# Patient Record
Sex: FEMALE | Race: WHITE | Employment: OTHER | URBAN - METROPOLITAN AREA
[De-identification: names, ages, dates, MRNs, and addresses within clinical notes are randomized per-mention and may not be internally consistent; named-entity substitution may affect disease eponyms.]

---

## 2016-08-12 LAB
ANTIBODY SCREEN: NEGATIVE
C. TRACHOMATIS ANTIGEN: NEGATIVE
CYSTIC FIBROSIS ALLELE 1: NEGATIVE
HCT: 36.7 %
HEPATITIS B SURFACE ANTIGEN: NONREACTIVE
HGB: 12.4 G/DL (ref 12–16)
MEAN CELL VOLUME: 86 FL (ref 81–100)
N.GONORRHOEAE ANTIGEN: NEGATIVE
PLATELETS: 361 10(3)UL
RAPID PLASMA REAGIN: NONREACTIVE
RH BLOOD TYPE: POSITIVE
RUBELLA IGG: NEGATIVE
THINPREP PAP: NEGATIVE

## 2017-01-31 ENCOUNTER — OFFICE VISIT (OUTPATIENT)
Dept: PERINATAL CARE | Facility: HOSPITAL | Age: 37
End: 2017-01-31
Attending: OBSTETRICS & GYNECOLOGY
Payer: COMMERCIAL

## 2017-01-31 VITALS — DIASTOLIC BLOOD PRESSURE: 66 MMHG | WEIGHT: 195 LBS | HEART RATE: 83 BPM | SYSTOLIC BLOOD PRESSURE: 155 MMHG

## 2017-01-31 DIAGNOSIS — O99.212 OBESITY AFFECTING PREGNANCY IN SECOND TRIMESTER: ICD-10-CM

## 2017-01-31 DIAGNOSIS — O34.219 PREVIOUS CESAREAN DELIVERY AFFECTING PREGNANCY, ANTEPARTUM: ICD-10-CM

## 2017-01-31 DIAGNOSIS — O28.8 AMNIOTIC FLUID INDEX BORDERLINE LOW: ICD-10-CM

## 2017-01-31 DIAGNOSIS — O09.523 AMA (ADVANCED MATERNAL AGE) MULTIGRAVIDA 35+, THIRD TRIMESTER: Primary | ICD-10-CM

## 2017-01-31 DIAGNOSIS — O09.813 PREGNANCY RESULTING FROM ASSISTED REPRODUCTIVE TECHNOLOGY, THIRD TRIMESTER: ICD-10-CM

## 2017-01-31 PROCEDURE — 76820 UMBILICAL ARTERY ECHO: CPT

## 2017-01-31 PROCEDURE — 99214 OFFICE O/P EST MOD 30 MIN: CPT

## 2017-01-31 PROCEDURE — 76805 OB US >/= 14 WKS SNGL FETUS: CPT

## 2017-01-31 PROCEDURE — 76819 FETAL BIOPHYS PROFIL W/O NST: CPT

## 2017-01-31 NOTE — PROGRESS NOTES
Jaden Hansen    Dear Dr. Geneva Jorgensen    Thank you for requesting ultrasound evaluation and maternal fetal medicine consultation on your patient Pam Boland.   As you are aware she is a 39year old female  with a Single Placenta: posterior. Fetal Anatomy:  Visualized with normal appearance: 4 chamber heart and great vessels, bladder. Brain: Visualized and normal appearance: cerebellum. Gastrointestinal Tract: stomach visible.     Summary of Ultrasound Findings: 33w2d  · AMA: low-risk PGD, declined invasive testing   · Maternal Obesity   · IVF Gestation   · Prior C/S  · Low-normal CHRIS  · Minimally elevated BP: No signs or symptoms of preeclampsia    RECOMMENDATIONS:  · Continue care with Dr. Percy Bailey  · Begin Weekly

## 2017-02-09 ENCOUNTER — OFFICE VISIT (OUTPATIENT)
Dept: PERINATAL CARE | Facility: HOSPITAL | Age: 37
End: 2017-02-09
Attending: OBSTETRICS & GYNECOLOGY
Payer: COMMERCIAL

## 2017-02-09 VITALS — WEIGHT: 200 LBS | SYSTOLIC BLOOD PRESSURE: 129 MMHG | HEART RATE: 92 BPM | DIASTOLIC BLOOD PRESSURE: 63 MMHG

## 2017-02-09 DIAGNOSIS — Z03.71 SUSPECTED PROBLEM WITH AMNIOTIC CAVITY AND MEMBRANE NOT FOUND: ICD-10-CM

## 2017-02-09 DIAGNOSIS — O09.813 PREGNANCY RESULTING FROM ASSISTED REPRODUCTIVE TECHNOLOGY, THIRD TRIMESTER: ICD-10-CM

## 2017-02-09 DIAGNOSIS — O09.523 AMA (ADVANCED MATERNAL AGE) MULTIGRAVIDA 35+, THIRD TRIMESTER: Primary | ICD-10-CM

## 2017-02-09 PROCEDURE — 76819 FETAL BIOPHYS PROFIL W/O NST: CPT

## 2017-02-09 PROCEDURE — 76820 UMBILICAL ARTERY ECHO: CPT

## 2017-02-09 PROCEDURE — 99213 OFFICE O/P EST LOW 20 MIN: CPT

## 2017-02-09 NOTE — PROGRESS NOTES
Zandra Maier    Dear Dr. Archie Ibrahim    Thank you for requesting ultrasound evaluation and maternal fetal medicine consultation on your patient Guerline Hurd.   As you are aware she is a 39year old female  with a Single ____________________________________________________________________________     I interpreted the results and reviewed them with the patient.     DISCUSSION  During her visit we discussed and reviewed the following issues:  IVF GESTATION   In light of

## 2017-02-20 ENCOUNTER — MED REC SCAN ONLY (OUTPATIENT)
Dept: OBGYN CLINIC | Facility: CLINIC | Age: 37
End: 2017-02-20

## 2017-02-20 ENCOUNTER — OFFICE VISIT (OUTPATIENT)
Dept: OBGYN CLINIC | Facility: CLINIC | Age: 37
End: 2017-02-20

## 2017-02-20 VITALS
WEIGHT: 200 LBS | DIASTOLIC BLOOD PRESSURE: 70 MMHG | SYSTOLIC BLOOD PRESSURE: 122 MMHG | BODY MASS INDEX: 33.32 KG/M2 | HEIGHT: 65 IN

## 2017-02-20 DIAGNOSIS — O99.212 OBESITY AFFECTING PREGNANCY IN SECOND TRIMESTER: ICD-10-CM

## 2017-02-20 DIAGNOSIS — O28.8 AMNIOTIC FLUID INDEX BORDERLINE LOW: ICD-10-CM

## 2017-02-20 DIAGNOSIS — O09.813 PREGNANCY RESULTING FROM ASSISTED REPRODUCTIVE TECHNOLOGY, THIRD TRIMESTER: ICD-10-CM

## 2017-02-20 DIAGNOSIS — O09.523 AMA (ADVANCED MATERNAL AGE) MULTIGRAVIDA 35+, THIRD TRIMESTER: Primary | ICD-10-CM

## 2017-02-20 PROCEDURE — 59025 FETAL NON-STRESS TEST: CPT | Performed by: OBSTETRICS & GYNECOLOGY

## 2017-02-20 PROCEDURE — 87081 CULTURE SCREEN ONLY: CPT | Performed by: OBSTETRICS & GYNECOLOGY

## 2017-02-22 NOTE — PROGRESS NOTES
Quick Note:    Called and spoke with patient. Informed of normal test results.  Pt expressed understanding.  ______

## 2017-02-25 ENCOUNTER — OFFICE VISIT (OUTPATIENT)
Dept: OBGYN CLINIC | Facility: CLINIC | Age: 37
End: 2017-02-25

## 2017-02-25 VITALS
SYSTOLIC BLOOD PRESSURE: 118 MMHG | HEART RATE: 92 BPM | DIASTOLIC BLOOD PRESSURE: 56 MMHG | HEIGHT: 65 IN | BODY MASS INDEX: 33.32 KG/M2 | WEIGHT: 200 LBS

## 2017-02-25 DIAGNOSIS — O09.523 AMA (ADVANCED MATERNAL AGE) MULTIGRAVIDA 35+, THIRD TRIMESTER: ICD-10-CM

## 2017-02-25 DIAGNOSIS — Z34.83 PRENATAL CARE, SUBSEQUENT PREGNANCY, THIRD TRIMESTER: Primary | ICD-10-CM

## 2017-02-25 RX ORDER — PRENATAL 71/IRON/FOLIC AC/DHA 30-1.4-2
CAPSULE,IMMEDIATE, DELAY RELEASE,BIPHASE ORAL
Refills: 10 | COMMUNITY
Start: 2017-01-24 | End: 2018-05-05

## 2017-02-25 RX ORDER — AMOXICILLIN 500 MG/1
CAPSULE ORAL
Refills: 0 | COMMUNITY
Start: 2016-12-12 | End: 2017-03-04

## 2017-02-25 RX ORDER — FLUTICASONE PROPIONATE 50 MCG
SPRAY, SUSPENSION (ML) NASAL
Refills: 0 | COMMUNITY
Start: 2016-12-12 | End: 2017-03-04

## 2017-02-27 ENCOUNTER — TELEPHONE (OUTPATIENT)
Dept: OBGYN CLINIC | Facility: CLINIC | Age: 37
End: 2017-02-27

## 2017-02-27 NOTE — TELEPHONE ENCOUNTER
Patient called with c/o discomfort in vaginal area, no contractions, no bleeding. Patient had similar discomfort few weeks ago and was seen by MD in the office.  Patient instructed to decrease activity and take frequent breaks between walking or standing fo

## 2017-03-04 ENCOUNTER — OFFICE VISIT (OUTPATIENT)
Dept: OBGYN CLINIC | Facility: CLINIC | Age: 37
End: 2017-03-04

## 2017-03-04 VITALS
BODY MASS INDEX: 33.49 KG/M2 | SYSTOLIC BLOOD PRESSURE: 108 MMHG | DIASTOLIC BLOOD PRESSURE: 60 MMHG | HEIGHT: 65 IN | WEIGHT: 201 LBS

## 2017-03-04 DIAGNOSIS — O99.212 OBESITY AFFECTING PREGNANCY IN SECOND TRIMESTER: ICD-10-CM

## 2017-03-04 DIAGNOSIS — O09.523 AMA (ADVANCED MATERNAL AGE) MULTIGRAVIDA 35+, THIRD TRIMESTER: Primary | ICD-10-CM

## 2017-03-04 DIAGNOSIS — O09.813 PREGNANCY RESULTING FROM ASSISTED REPRODUCTIVE TECHNOLOGY, THIRD TRIMESTER: ICD-10-CM

## 2017-03-04 DIAGNOSIS — Z34.83 PRENATAL CARE, SUBSEQUENT PREGNANCY, THIRD TRIMESTER: ICD-10-CM

## 2017-03-04 PROCEDURE — 59025 FETAL NON-STRESS TEST: CPT | Performed by: OBSTETRICS & GYNECOLOGY

## 2017-03-06 ENCOUNTER — NURSE ONLY (OUTPATIENT)
Dept: OBGYN CLINIC | Facility: CLINIC | Age: 37
End: 2017-03-06

## 2017-03-06 ENCOUNTER — OFFICE VISIT (OUTPATIENT)
Dept: OBGYN CLINIC | Facility: CLINIC | Age: 37
End: 2017-03-06

## 2017-03-06 VITALS
DIASTOLIC BLOOD PRESSURE: 60 MMHG | SYSTOLIC BLOOD PRESSURE: 110 MMHG | BODY MASS INDEX: 34.99 KG/M2 | HEIGHT: 65 IN | WEIGHT: 210 LBS

## 2017-03-06 DIAGNOSIS — O28.8 AMNIOTIC FLUID INDEX BORDERLINE LOW: ICD-10-CM

## 2017-03-06 DIAGNOSIS — Z34.83 PRENATAL CARE, SUBSEQUENT PREGNANCY, THIRD TRIMESTER: Primary | ICD-10-CM

## 2017-03-06 DIAGNOSIS — O41.00X0: ICD-10-CM

## 2017-03-06 DIAGNOSIS — O09.523 AMA (ADVANCED MATERNAL AGE) MULTIGRAVIDA 35+, THIRD TRIMESTER: ICD-10-CM

## 2017-03-06 DIAGNOSIS — Z3A.38 38 WEEKS GESTATION OF PREGNANCY: ICD-10-CM

## 2017-03-06 PROCEDURE — 76816 OB US FOLLOW-UP PER FETUS: CPT | Performed by: OBSTETRICS & GYNECOLOGY

## 2017-03-11 ENCOUNTER — OFFICE VISIT (OUTPATIENT)
Dept: OBGYN CLINIC | Facility: CLINIC | Age: 37
End: 2017-03-11

## 2017-03-11 VITALS
DIASTOLIC BLOOD PRESSURE: 62 MMHG | HEIGHT: 65 IN | BODY MASS INDEX: 33.66 KG/M2 | HEART RATE: 100 BPM | WEIGHT: 202 LBS | SYSTOLIC BLOOD PRESSURE: 118 MMHG

## 2017-03-11 DIAGNOSIS — Z34.83 PRENATAL CARE, SUBSEQUENT PREGNANCY, THIRD TRIMESTER: Primary | ICD-10-CM

## 2017-03-11 DIAGNOSIS — O09.523 AMA (ADVANCED MATERNAL AGE) MULTIGRAVIDA 35+, THIRD TRIMESTER: ICD-10-CM

## 2017-03-11 DIAGNOSIS — Z3A.38 38 WEEKS GESTATION OF PREGNANCY: ICD-10-CM

## 2017-03-11 PROCEDURE — 59025 FETAL NON-STRESS TEST: CPT | Performed by: OBSTETRICS & GYNECOLOGY

## 2017-03-18 ENCOUNTER — OFFICE VISIT (OUTPATIENT)
Dept: OBGYN CLINIC | Facility: CLINIC | Age: 37
End: 2017-03-18

## 2017-03-18 VITALS
SYSTOLIC BLOOD PRESSURE: 118 MMHG | HEIGHT: 65 IN | BODY MASS INDEX: 34.16 KG/M2 | WEIGHT: 205 LBS | DIASTOLIC BLOOD PRESSURE: 60 MMHG

## 2017-03-18 DIAGNOSIS — O09.523 AMA (ADVANCED MATERNAL AGE) MULTIGRAVIDA 35+, THIRD TRIMESTER: Primary | ICD-10-CM

## 2017-03-18 PROCEDURE — 59025 FETAL NON-STRESS TEST: CPT | Performed by: OBSTETRICS & GYNECOLOGY

## 2017-03-21 ENCOUNTER — TELEPHONE (OUTPATIENT)
Dept: OBGYN UNIT | Facility: HOSPITAL | Age: 37
End: 2017-03-21

## 2017-03-22 ENCOUNTER — TELEPHONE (OUTPATIENT)
Dept: OBGYN CLINIC | Facility: CLINIC | Age: 37
End: 2017-03-22

## 2017-03-22 ENCOUNTER — HOSPITAL ENCOUNTER (INPATIENT)
Facility: HOSPITAL | Age: 37
LOS: 4 days | Discharge: HOME | End: 2017-03-26
Attending: OBSTETRICS & GYNECOLOGY | Admitting: OBSTETRICS & GYNECOLOGY
Payer: COMMERCIAL

## 2017-03-22 DIAGNOSIS — O34.219 PREVIOUS CESAREAN DELIVERY AFFECTING PREGNANCY, ANTEPARTUM: Primary | ICD-10-CM

## 2017-03-22 PROBLEM — Z34.90 PREGNANCY: Status: ACTIVE | Noted: 2017-03-22

## 2017-03-22 PROBLEM — Z34.90 PREGNANCY (HCC): Status: ACTIVE | Noted: 2017-03-22

## 2017-03-22 RX ORDER — DEXTROSE, SODIUM CHLORIDE, SODIUM LACTATE, POTASSIUM CHLORIDE, AND CALCIUM CHLORIDE 5; .6; .31; .03; .02 G/100ML; G/100ML; G/100ML; G/100ML; G/100ML
INJECTION, SOLUTION INTRAVENOUS AS NEEDED
Status: DISCONTINUED | OUTPATIENT
Start: 2017-03-22 | End: 2017-03-23 | Stop reason: HOSPADM

## 2017-03-22 RX ORDER — SODIUM CHLORIDE, SODIUM LACTATE, POTASSIUM CHLORIDE, CALCIUM CHLORIDE 600; 310; 30; 20 MG/100ML; MG/100ML; MG/100ML; MG/100ML
INJECTION, SOLUTION INTRAVENOUS CONTINUOUS
Status: DISCONTINUED | OUTPATIENT
Start: 2017-03-22 | End: 2017-03-23 | Stop reason: HOSPADM

## 2017-03-22 RX ORDER — HYDROMORPHONE HYDROCHLORIDE 1 MG/ML
1 INJECTION, SOLUTION INTRAMUSCULAR; INTRAVENOUS; SUBCUTANEOUS EVERY 2 HOUR PRN
Status: DISCONTINUED | OUTPATIENT
Start: 2017-03-22 | End: 2017-03-24

## 2017-03-22 RX ORDER — IBUPROFEN 600 MG/1
600 TABLET ORAL ONCE AS NEEDED
Status: DISCONTINUED | OUTPATIENT
Start: 2017-03-22 | End: 2017-03-23 | Stop reason: HOSPADM

## 2017-03-22 RX ORDER — TERBUTALINE SULFATE 1 MG/ML
0.25 INJECTION, SOLUTION SUBCUTANEOUS AS NEEDED
Status: DISCONTINUED | OUTPATIENT
Start: 2017-03-22 | End: 2017-03-23 | Stop reason: HOSPADM

## 2017-03-22 RX ORDER — ZOLPIDEM TARTRATE 5 MG/1
5 TABLET ORAL NIGHTLY PRN
Status: DISCONTINUED | OUTPATIENT
Start: 2017-03-22 | End: 2017-03-26

## 2017-03-22 NOTE — TELEPHONE ENCOUNTER
Per Dr. Tonya Moncada patient will proceed with the schedule induction as discussed in previous appointment

## 2017-03-22 NOTE — PROGRESS NOTES
Pt is a 39year old female admitted to TRG4/TRG4-A, Patient presents with:  R/o Labor: pt c/o contractions regular every 3-5 minutes for last 2hrs     Pt is 40w3d intra-uterine pregnancy. Hx of c/s x1, desires TOLAC. Denies any leaking of fluid.  Reports +f

## 2017-03-22 NOTE — TELEPHONE ENCOUNTER
Pt is scheduled for induction this Fri 03/24/17, wants to make sure it's ok to do considering she had previous C/S.

## 2017-03-23 ENCOUNTER — SURGERY (OUTPATIENT)
Age: 37
End: 2017-03-23

## 2017-03-23 ENCOUNTER — ANESTHESIA (OUTPATIENT)
Dept: OBGYN UNIT | Facility: HOSPITAL | Age: 37
End: 2017-03-23
Payer: COMMERCIAL

## 2017-03-23 ENCOUNTER — ANESTHESIA EVENT (OUTPATIENT)
Dept: OBGYN UNIT | Facility: HOSPITAL | Age: 37
End: 2017-03-23
Payer: COMMERCIAL

## 2017-03-23 PROCEDURE — 59515 CESAREAN DELIVERY: CPT | Performed by: OBSTETRICS & GYNECOLOGY

## 2017-03-23 PROCEDURE — 59514 CESAREAN DELIVERY ONLY: CPT | Performed by: OBSTETRICS & GYNECOLOGY

## 2017-03-23 RX ORDER — KETOROLAC TROMETHAMINE 30 MG/ML
INJECTION, SOLUTION INTRAMUSCULAR; INTRAVENOUS
Status: COMPLETED
Start: 2017-03-23 | End: 2017-03-23

## 2017-03-23 RX ORDER — HYDROCODONE BITARTRATE AND ACETAMINOPHEN 5; 325 MG/1; MG/1
1 TABLET ORAL EVERY 4 HOURS PRN
Status: DISCONTINUED | OUTPATIENT
Start: 2017-03-23 | End: 2017-03-26

## 2017-03-23 RX ORDER — EPHEDRINE SULFATE 50 MG/ML
5 INJECTION, SOLUTION INTRAVENOUS AS NEEDED
Status: DISCONTINUED | OUTPATIENT
Start: 2017-03-23 | End: 2017-03-26

## 2017-03-23 RX ORDER — DOCUSATE SODIUM 100 MG/1
100 CAPSULE, LIQUID FILLED ORAL
Status: DISCONTINUED | OUTPATIENT
Start: 2017-03-24 | End: 2017-03-26

## 2017-03-23 RX ORDER — METOCLOPRAMIDE HYDROCHLORIDE 5 MG/ML
10 INJECTION INTRAMUSCULAR; INTRAVENOUS EVERY 6 HOURS PRN
Status: DISCONTINUED | OUTPATIENT
Start: 2017-03-23 | End: 2017-03-26

## 2017-03-23 RX ORDER — ONDANSETRON 2 MG/ML
4 INJECTION INTRAMUSCULAR; INTRAVENOUS ONCE AS NEEDED
Status: ACTIVE | OUTPATIENT
Start: 2017-03-23 | End: 2017-03-23

## 2017-03-23 RX ORDER — ZOLPIDEM TARTRATE 5 MG/1
5 TABLET ORAL NIGHTLY PRN
Status: DISCONTINUED | OUTPATIENT
Start: 2017-03-23 | End: 2017-03-23

## 2017-03-23 RX ORDER — CEFAZOLIN SODIUM 1 G/3ML
INJECTION, POWDER, FOR SOLUTION INTRAMUSCULAR; INTRAVENOUS
Status: DISCONTINUED | OUTPATIENT
Start: 2017-03-23 | End: 2017-03-23 | Stop reason: HOSPADM

## 2017-03-23 RX ORDER — ONDANSETRON 2 MG/ML
4 INJECTION INTRAMUSCULAR; INTRAVENOUS EVERY 6 HOURS PRN
Status: DISCONTINUED | OUTPATIENT
Start: 2017-03-23 | End: 2017-03-26

## 2017-03-23 RX ORDER — KETOROLAC TROMETHAMINE 30 MG/ML
30 INJECTION, SOLUTION INTRAMUSCULAR; INTRAVENOUS ONCE AS NEEDED
Status: ACTIVE | OUTPATIENT
Start: 2017-03-23 | End: 2017-03-23

## 2017-03-23 RX ORDER — HYDROMORPHONE HYDROCHLORIDE 1 MG/ML
0.4 INJECTION, SOLUTION INTRAMUSCULAR; INTRAVENOUS; SUBCUTANEOUS EVERY 5 MIN PRN
Status: DISPENSED | OUTPATIENT
Start: 2017-03-23 | End: 2017-03-24

## 2017-03-23 RX ORDER — SIMETHICONE 80 MG
80 TABLET,CHEWABLE ORAL 3 TIMES DAILY PRN
Status: DISCONTINUED | OUTPATIENT
Start: 2017-03-23 | End: 2017-03-26

## 2017-03-23 RX ORDER — DEXTROSE, SODIUM CHLORIDE, SODIUM LACTATE, POTASSIUM CHLORIDE, AND CALCIUM CHLORIDE 5; .6; .31; .03; .02 G/100ML; G/100ML; G/100ML; G/100ML; G/100ML
INJECTION, SOLUTION INTRAVENOUS CONTINUOUS
Status: DISCONTINUED | OUTPATIENT
Start: 2017-03-24 | End: 2017-03-26

## 2017-03-23 RX ORDER — HYDROMORPHONE HYDROCHLORIDE 1 MG/ML
0.4 INJECTION, SOLUTION INTRAMUSCULAR; INTRAVENOUS; SUBCUTANEOUS EVERY 2 HOUR PRN
Status: ACTIVE | OUTPATIENT
Start: 2017-03-23 | End: 2017-03-24

## 2017-03-23 RX ORDER — IBUPROFEN 600 MG/1
600 TABLET ORAL EVERY 6 HOURS SCHEDULED
Status: DISCONTINUED | OUTPATIENT
Start: 2017-03-24 | End: 2017-03-26

## 2017-03-23 RX ORDER — MORPHINE SULFATE 0.5 MG/ML
2 INJECTION, SOLUTION EPIDURAL; INTRATHECAL; INTRAVENOUS ONCE
Status: DISCONTINUED | OUTPATIENT
Start: 2017-03-23 | End: 2017-03-26

## 2017-03-23 RX ORDER — KETOROLAC TROMETHAMINE 30 MG/ML
30 INJECTION, SOLUTION INTRAMUSCULAR; INTRAVENOUS EVERY 6 HOURS PRN
Status: DISCONTINUED | OUTPATIENT
Start: 2017-03-23 | End: 2017-03-23

## 2017-03-23 RX ORDER — NALBUPHINE HCL 10 MG/ML
2.5 AMPUL (ML) INJECTION EVERY 4 HOURS PRN
Status: DISCONTINUED | OUTPATIENT
Start: 2017-03-23 | End: 2017-03-26

## 2017-03-23 RX ORDER — NALOXONE HYDROCHLORIDE 0.4 MG/ML
0.08 INJECTION, SOLUTION INTRAMUSCULAR; INTRAVENOUS; SUBCUTANEOUS
Status: ACTIVE | OUTPATIENT
Start: 2017-03-23 | End: 2017-03-24

## 2017-03-23 RX ORDER — HYDROMORPHONE HYDROCHLORIDE 1 MG/ML
INJECTION, SOLUTION INTRAMUSCULAR; INTRAVENOUS; SUBCUTANEOUS
Status: DISCONTINUED
Start: 2017-03-23 | End: 2017-03-24

## 2017-03-23 RX ORDER — SODIUM CHLORIDE, SODIUM LACTATE, POTASSIUM CHLORIDE, CALCIUM CHLORIDE 600; 310; 30; 20 MG/100ML; MG/100ML; MG/100ML; MG/100ML
INJECTION, SOLUTION INTRAVENOUS CONTINUOUS
Status: DISCONTINUED | OUTPATIENT
Start: 2017-03-23 | End: 2017-03-26

## 2017-03-23 RX ORDER — DIPHENHYDRAMINE HYDROCHLORIDE 50 MG/ML
12.5 INJECTION INTRAMUSCULAR; INTRAVENOUS EVERY 4 HOURS PRN
Status: DISCONTINUED | OUTPATIENT
Start: 2017-03-23 | End: 2017-03-26

## 2017-03-23 RX ORDER — NALBUPHINE HCL 10 MG/ML
2.5 AMPUL (ML) INJECTION
Status: DISCONTINUED | OUTPATIENT
Start: 2017-03-23 | End: 2017-03-23

## 2017-03-23 RX ORDER — BISACODYL 10 MG
10 SUPPOSITORY, RECTAL RECTAL
Status: DISCONTINUED | OUTPATIENT
Start: 2017-03-23 | End: 2017-03-26

## 2017-03-23 RX ORDER — KETOROLAC TROMETHAMINE 30 MG/ML
30 INJECTION, SOLUTION INTRAMUSCULAR; INTRAVENOUS EVERY 6 HOURS PRN
Status: DISPENSED | OUTPATIENT
Start: 2017-03-23 | End: 2017-03-25

## 2017-03-23 RX ORDER — DIPHENHYDRAMINE HYDROCHLORIDE 50 MG/ML
25 INJECTION INTRAMUSCULAR; INTRAVENOUS ONCE AS NEEDED
Status: ACTIVE | OUTPATIENT
Start: 2017-03-23 | End: 2017-03-23

## 2017-03-23 RX ORDER — NALBUPHINE HCL 10 MG/ML
2.5 AMPUL (ML) INJECTION
Status: DISCONTINUED | OUTPATIENT
Start: 2017-03-23 | End: 2017-03-26

## 2017-03-23 RX ORDER — DIPHENHYDRAMINE HCL 25 MG
25 CAPSULE ORAL EVERY 4 HOURS PRN
Status: DISCONTINUED | OUTPATIENT
Start: 2017-03-23 | End: 2017-03-26

## 2017-03-23 RX ORDER — MEPERIDINE HYDROCHLORIDE 25 MG/ML
12.5 INJECTION INTRAMUSCULAR; INTRAVENOUS; SUBCUTANEOUS ONCE AS NEEDED
Status: ACTIVE | OUTPATIENT
Start: 2017-03-23 | End: 2017-03-23

## 2017-03-23 RX ORDER — HYDROCODONE BITARTRATE AND ACETAMINOPHEN 10; 325 MG/1; MG/1
1 TABLET ORAL EVERY 4 HOURS PRN
Status: DISCONTINUED | OUTPATIENT
Start: 2017-03-23 | End: 2017-03-26

## 2017-03-23 NOTE — PLAN OF CARE
Problem: Patient/Family Goals  Goal: Patient/Family Long Term Goal  Patient’s Long Term Goal: Safe vaginal birth    Interventions:  - EFM, communication with doctors  - See additional Care Plan goals for specific interventions   Outcome: Progressing    Pro

## 2017-03-23 NOTE — PROGRESS NOTES
Assuming care of the pt.  md in room discussing poc with pt. Pt in agreement. Report to nicole Hopson rn to assume care of the pt

## 2017-03-23 NOTE — H&P
[address] [place] [address],[address] Patient Status:  Inpatient    1980 MRN [id number]   [facility] 1NW-A Attending Shireen Watters, [address] [facility] Day # 1 PCP Mary Balbuena MD     Date of Admission:  3/22/2017    SUBJE auscultation bilaterally   Heart:   regular rate and rhythm, S1, S2 normal, no murmur, click, rub or gallop   Abdomen: FHT present   Fetal Surveillance:  140 BPM   Fetal heart variability: moderate      Cervix: no lesions or cervical motion tenderness and

## 2017-03-23 NOTE — PLAN OF CARE
Problem: Patient/Family Goals  Goal: Patient/Family Short Term Goal  Patient’s Short Term Goal: pain control    Interventions:   - Breathing techniques, IV pain medication, and epidural  - See additional Care Plan goals for specific interventions   Outcome

## 2017-03-24 ENCOUNTER — APPOINTMENT (OUTPATIENT)
Dept: CT IMAGING | Facility: HOSPITAL | Age: 37
End: 2017-03-24
Attending: OBSTETRICS & GYNECOLOGY
Payer: COMMERCIAL

## 2017-03-24 PROCEDURE — 74176 CT ABD & PELVIS W/O CONTRAST: CPT

## 2017-03-24 PROCEDURE — 30233N1 TRANSFUSION OF NONAUTOLOGOUS RED BLOOD CELLS INTO PERIPHERAL VEIN, PERCUTANEOUS APPROACH: ICD-10-PCS | Performed by: OBSTETRICS & GYNECOLOGY

## 2017-03-24 RX ORDER — SODIUM CHLORIDE 9 MG/ML
INJECTION, SOLUTION INTRAVENOUS ONCE
Status: DISCONTINUED | OUTPATIENT
Start: 2017-03-24 | End: 2017-03-26

## 2017-03-24 RX ORDER — SODIUM CHLORIDE 9 MG/ML
INJECTION, SOLUTION INTRAVENOUS ONCE
Status: COMPLETED | OUTPATIENT
Start: 2017-03-24 | End: 2017-03-24

## 2017-03-24 RX ORDER — MELATONIN
325
Status: DISCONTINUED | OUTPATIENT
Start: 2017-03-25 | End: 2017-03-26

## 2017-03-24 RX ORDER — SODIUM CHLORIDE, SODIUM LACTATE, POTASSIUM CHLORIDE, CALCIUM CHLORIDE 600; 310; 30; 20 MG/100ML; MG/100ML; MG/100ML; MG/100ML
INJECTION, SOLUTION INTRAVENOUS CONTINUOUS
Status: DISCONTINUED | OUTPATIENT
Start: 2017-03-24 | End: 2017-03-26

## 2017-03-24 NOTE — ANESTHESIA POSTPROCEDURE EVALUATION
[address] [facility],1St Floor Patient Status:  Inpatient   Age/Gender 39year old female MRN [id number]   [facility] 1NW-A Attending [address] [facility], [address] [facility] Day # 1 PCP Payal Nelson MD       Anesthesia Post-op Note    Procedure(s):

## 2017-03-24 NOTE — PROGRESS NOTES
[facility]  Post-Partum Caesarean Section Progress Note    Gloria Deal Patient Status:  Inpatient    1980 MRN [id number]   [facility] 1SW-J Attending Semaj Weaver, [address] [facility] Day # 2 PCP Margie Martinez MD     SUBJECTIVE:

## 2017-03-24 NOTE — PROGRESS NOTES
ctse re   Recent Labs   Lab  03/22/17 1953 03/24/17   0711   RBC  3.80  2.13*   HGB  11.2*  6.3*   HCT  32.8*  18.7*   MCV  86.3  87.8   MCH  29.5  29.6   MCHC  34.1  33.7   RDW  14.3  14.6   NEPRELIM   --   10.24*   WBC  7.9  12.7   PLT  258.0  189.0

## 2017-03-24 NOTE — PROGRESS NOTES
Pt medicated w/ Dilaudid 0.4 prior to getting pt up to the bathroom, Pt with dizziness, pallor and low BP while sitting on the side of the bed. Pt assisted back into bed. See vitals.    Dr Sav Cabrera called to update on pt condition, orders received for fluid b

## 2017-03-24 NOTE — ANESTHESIA PREPROCEDURE EVALUATION
PRE-OP EVALUATION    Patient Name: Amna Padgett    Pre-op Diagnosis: * No pre-op diagnosis entered *    Procedure(s):      Surgeon(s) and Role:     Taniya Garcia MD - Primary    Pre-op vitals reviewed.   Temp: 100.8 °F (38.2 °C)  Pulse: 111  Resp: 22 GI/Hepatic/Renal    Negative GI/hepatic/renal ROS. Cardiovascular    Negative cardiovascular ROS. Endo/Other    Negative endo/other ROS.                               Pulmonary

## 2017-03-24 NOTE — PROGRESS NOTES
Temp: 98.4 °F (36.9 °C)  Pulse: 97  Resp: 18  BP: 113/63 mmHg improved since 2 u PRBC. Elise Spies Urine out put good and clear.   abd  Distended  Tender without rebound  Ct scan to r/o int bleeding

## 2017-03-24 NOTE — PROGRESS NOTES
Spoke with Dr Xi Kam regarding results of cat scan and possible pneumonia. He is not coming to see the patient unless there is a status change. Patient is a febrile not short of breath and pulse ox is % on room air.  Will order incentive spirom

## 2017-03-24 NOTE — PROGRESS NOTES
[facility]   Section - Operative Note    Nithin Venegas Patient Status:  Inpatient    1980 MRN [id number]   [facility] 1NW-A Attending Kianna Acevedo, [address] [facility] Day # 1 PCP Deana Duggan MD       Preoperative Diagnosis: reapproximated with 3 0 vicryl. The peritoneum was reapproximated. .  The fascia was closed with 0 vicryl in a running fashion. The subcutaneous tissue was copiously irrigated and any bleeding cauterized.   The subcutaneous tissue was closed using subq The

## 2017-03-24 NOTE — OPERATIVE REPORT
[facility]    PATIENT'S NAME: Parker Burns   ATTENDING PHYSICIAN: Eber Power D.O.   OPERATING PHYSICIAN: Vignesh Cassidy M.D.    PATIENT ACCOUNT#:   [de-identified]    LOCATION:  [address] [place] [address]  MEDICAL RECORD #:   [id number]       DATE OF BIRTH Sponge, needle, and instrument counts were correct. The patient went to the recovery room in good condition, had an IV and Morrison in place.     Dictated By Andrez Short M.D.  d: 03/23/2017 19:51:44  t: 03/24/2017 05:13:56  [facility] [id number]  KLD/C00

## 2017-03-24 NOTE — PROGRESS NOTES
Pt transferred to OR # 3 via bed in stable condition for R c/s.  present. Report given to Ashley Hays d/t shift change.

## 2017-03-25 ENCOUNTER — APPOINTMENT (OUTPATIENT)
Dept: CT IMAGING | Facility: HOSPITAL | Age: 37
End: 2017-03-25
Attending: HOSPITALIST
Payer: COMMERCIAL

## 2017-03-25 PROCEDURE — 74176 CT ABD & PELVIS W/O CONTRAST: CPT

## 2017-03-25 PROCEDURE — 99252 IP/OBS CONSLTJ NEW/EST SF 35: CPT | Performed by: HOSPITALIST

## 2017-03-25 NOTE — PLAN OF CARE
GASTROINTESTINAL - ADULT    • Minimal or absence of nausea and vomiting Progressing    • Maintains or returns to baseline bowel function Progressing        GENITOURINARY - ADULT    • Absence of urinary retention Progressing        POSTPARTUM    • Long Term

## 2017-03-25 NOTE — PROGRESS NOTES
[facility]  Post-Partum Caesarean Section Progress Note    Leah Francisco Patient Status:  Inpatient    1980 MRN [id number]   [facility] 1SW-J Attending Norma Gunderson, [address] [facility] Day # 3 PCP Bharath Angeles MD     SUBJECTIVE:

## 2017-03-25 NOTE — PLAN OF CARE
GASTROINTESTINAL - ADULT    • Maintains adequate nutritional intake (undernourished) Completed    • Achieves appropriate nutritional intake (bariatric) Completed          GASTROINTESTINAL - ADULT    • Minimal or absence of nausea and vomiting Progressing

## 2017-03-25 NOTE — PROGRESS NOTES
Post partum check. Patient has a firm and distended abdomen, very faint distent bowel sounds noted. Patient encouraged to do deep breathing and use is q 1 hour. Both legs edematous scd''''s while in bed encouraged. Taught so lower leg exercises. Encouraged

## 2017-03-25 NOTE — PROGRESS NOTES
Assisted pateint up to the bathroom and patton removed, arjun care instructed and washed up at sink. Was able to pass large amounts of flatus.

## 2017-03-26 VITALS
TEMPERATURE: 98 F | DIASTOLIC BLOOD PRESSURE: 53 MMHG | RESPIRATION RATE: 18 BRPM | BODY MASS INDEX: 33.82 KG/M2 | HEIGHT: 65 IN | OXYGEN SATURATION: 96 % | WEIGHT: 203 LBS | SYSTOLIC BLOOD PRESSURE: 146 MMHG | HEART RATE: 86 BPM

## 2017-03-26 PROBLEM — D64.9 ANEMIA: Status: ACTIVE | Noted: 2017-03-26

## 2017-03-26 PROCEDURE — 99232 SBSQ HOSP IP/OBS MODERATE 35: CPT | Performed by: HOSPITALIST

## 2017-03-26 RX ORDER — FERROUS GLUCONATE 256(28)MG
1 TABLET ORAL DAILY
Qty: 30 TABLET | Refills: 6 | Status: SHIPPED | OUTPATIENT
Start: 2017-03-26 | End: 2017-04-16 | Stop reason: ALTCHOICE

## 2017-03-26 RX ORDER — HYDROCODONE BITARTRATE AND ACETAMINOPHEN 5; 325 MG/1; MG/1
1 TABLET ORAL EVERY 4 HOURS PRN
Qty: 30 TABLET | Refills: 0 | Status: SHIPPED | OUTPATIENT
Start: 2017-03-26 | End: 2017-04-05

## 2017-03-26 RX ORDER — IBUPROFEN 600 MG/1
600 TABLET ORAL EVERY 6 HOURS SCHEDULED
Qty: 30 TABLET | Refills: 0 | Status: SHIPPED | OUTPATIENT
Start: 2017-03-26 | End: 2017-05-04

## 2017-03-26 NOTE — PROGRESS NOTES
Dr Joshua Garza [facility]) called to update on pt condition. abd firmly distended, tympanic BS on the lt, hypo/faint on the rt. Passing gas a few times. Pt ambulated in the hallway, reports pain med tolerable with Norco.  Orders received for STAT CBC and CT.  P

## 2017-03-26 NOTE — PROGRESS NOTES
Pt seen and examined. No acute events, denies chest pain, shortness of breath, fever, chills, or cough.   Abdominal distension improving.  + leg swelling    /53 mmHg  Pulse 86  Temp(Src) 98.2 °F (36.8 °C) (Oral)  Resp 18  Ht 5' 5\" (1.651 m)  Wt 203

## 2017-03-26 NOTE — PLAN OF CARE
GASTROINTESTINAL - ADULT    • Minimal or absence of nausea and vomiting Completed    • Maintains or returns to baseline bowel function Completed        GENITOURINARY - ADULT    • Absence of urinary retention Completed        POSTPARTUM    • Long Term Goal:

## 2017-03-26 NOTE — PROGRESS NOTES
PPD#1    Pt has no complaints, lochia min.  Passing gas, no SOB ,no coughing   03/26/17  0726   BP: 146/53   Pulse: 86   Temp: 98.2 °F (36.8 °C)   Resp: 18     Recent Labs   Lab  03/24/17   0711  03/24/17   1616  03/25/17   1448  03/25/17   2138   RBC  2.13

## 2017-03-26 NOTE — CONSULTS
[facility]  Nataliia Stephens Patient Status:  Inpatient    1980 MRN [id number]   [facility] 1SW-J Attending Pradip Pineda, [address] [facility] Day # 3 PCP Adams Valenzuela MD     Reason for consult: Abdominal distention    R Grandmother    • Heart Disorder Paternal Grandmother        Allergies: No Known Allergies    Medications:    No current facility-administered medications on file prior to encounter.   Current Outpatient Prescriptions on File Prior to Encounter:  [facility].      ASSESSMENT / PLAN:     1. Anemia-acute postop bleed. Patient's hemoglobin went down to 6.3 on March 24 at 7 AM and she was transfused 2 units of packed RBCs. Hemoglobin was checked on March 24 at 4 PM and it was 9.   Her hemoglobin had stabiliz

## 2017-03-28 ENCOUNTER — OFFICE VISIT (OUTPATIENT)
Dept: OBGYN CLINIC | Facility: CLINIC | Age: 37
End: 2017-03-28

## 2017-03-28 ENCOUNTER — TELEPHONE (OUTPATIENT)
Dept: OBGYN UNIT | Facility: HOSPITAL | Age: 37
End: 2017-03-28

## 2017-03-28 VITALS
HEIGHT: 65 IN | WEIGHT: 198 LBS | SYSTOLIC BLOOD PRESSURE: 102 MMHG | TEMPERATURE: 98 F | HEART RATE: 88 BPM | DIASTOLIC BLOOD PRESSURE: 60 MMHG | RESPIRATION RATE: 18 BRPM | BODY MASS INDEX: 32.99 KG/M2

## 2017-03-28 DIAGNOSIS — O09.529 AMA (ADVANCED MATERNAL AGE) MULTIGRAVIDA 35+, UNSPECIFIED TRIMESTER: Primary | ICD-10-CM

## 2017-03-28 PROBLEM — Z34.90 PREGNANCY: Status: RESOLVED | Noted: 2017-03-22 | Resolved: 2017-03-28

## 2017-03-28 PROBLEM — Z34.90 PREGNANCY (HCC): Status: RESOLVED | Noted: 2017-03-22 | Resolved: 2017-03-28

## 2017-03-28 PROBLEM — O28.8 AMNIOTIC FLUID INDEX BORDERLINE LOW: Status: RESOLVED | Noted: 2017-01-31 | Resolved: 2017-03-28

## 2017-03-28 RX ORDER — TRIAMTERENE AND HYDROCHLOROTHIAZIDE 37.5; 25 MG/1; MG/1
1 CAPSULE ORAL
Qty: 30 CAPSULE | Refills: 0 | Status: SHIPPED | OUTPATIENT
Start: 2017-03-28 | End: 2017-05-04

## 2017-03-28 NOTE — PROGRESS NOTES
C/o dressing coming off. Hematoma. Le swelling and woody apperaance of incion. . hctz 25 mg q d.  6 inches of old hematoma above incision. incsion intact.  rtc i w

## 2017-03-28 NOTE — PROGRESS NOTES
REV'D SELF AND INFANT CARE WITH MOM. VERBALIZES UNDERSTANDING OF INSTRUCTIONS REV'D. ENCOURAGED TO FOLLOW-UP WITH MDS AS DIRECTED AND WITH QUESTIONS. DENIES ANY PRE-ECLAMPSIA OR HIGH BP ISSUES.

## 2017-03-29 ENCOUNTER — TELEPHONE (OUTPATIENT)
Dept: OBGYN CLINIC | Facility: CLINIC | Age: 37
End: 2017-03-29

## 2017-03-29 NOTE — TELEPHONE ENCOUNTER
Patient called with questions r/t hematoma around her incision. Questions answered. Patient instructed to call if any changes or additional questions.

## 2017-03-29 NOTE — TELEPHONE ENCOUNTER
----- Message from Brenda Schwarz sent at 3/29/2017  3:36 PM CDT -----  Marlon Mustafa,  Please call the patient, she's still experiencing  symptoms for which she saw Annette Sauceda yesterday, and is very concerned. Please call her today.   Thank you

## 2017-03-30 ENCOUNTER — TELEPHONE (OUTPATIENT)
Dept: OBGYN CLINIC | Facility: CLINIC | Age: 37
End: 2017-03-30

## 2017-03-31 ENCOUNTER — TELEPHONE (OUTPATIENT)
Dept: OBGYN CLINIC | Facility: CLINIC | Age: 37
End: 2017-03-31

## 2017-04-05 ENCOUNTER — OFFICE VISIT (OUTPATIENT)
Dept: OBGYN CLINIC | Facility: CLINIC | Age: 37
End: 2017-04-05

## 2017-04-05 VITALS
DIASTOLIC BLOOD PRESSURE: 64 MMHG | WEIGHT: 184 LBS | HEART RATE: 88 BPM | BODY MASS INDEX: 30.66 KG/M2 | HEIGHT: 65 IN | SYSTOLIC BLOOD PRESSURE: 110 MMHG

## 2017-04-05 DIAGNOSIS — L76.82 INCISIONAL PAIN: Primary | ICD-10-CM

## 2017-04-05 PROCEDURE — 99212 OFFICE O/P EST SF 10 MIN: CPT | Performed by: OBSTETRICS & GYNECOLOGY

## 2017-04-05 RX ORDER — HYDROCODONE BITARTRATE AND ACETAMINOPHEN 5; 325 MG/1; MG/1
1 TABLET ORAL EVERY 4 HOURS PRN
Qty: 30 TABLET | Refills: 0 | Status: SHIPPED | OUTPATIENT
Start: 2017-04-05 | End: 2017-05-04

## 2017-04-05 NOTE — PROGRESS NOTES
C/o incisional pain still getting intense, post c/s day 10 .  Feeling \"blue\"  Abdominal wall hematoma improving per patient    /64 mmHg  Pulse 88  Ht 65\"  Wt 184 lb  BMI 30.62 kg/m2  LMP 06/12/2016    Incision: clean, dry, healing  Abdominal wall h

## 2017-04-11 ENCOUNTER — TELEPHONE (OUTPATIENT)
Dept: OBGYN CLINIC | Facility: CLINIC | Age: 37
End: 2017-04-11

## 2017-04-11 NOTE — TELEPHONE ENCOUNTER
Spoke to patient to check on how she was feeling. Patient states that she feels great, still has occasional discomfort at the incision site. Patient instructed to call office with any concerns.

## 2017-04-12 ENCOUNTER — MED REC SCAN ONLY (OUTPATIENT)
Dept: OBGYN CLINIC | Facility: CLINIC | Age: 37
End: 2017-04-12

## 2017-04-16 ENCOUNTER — APPOINTMENT (OUTPATIENT)
Dept: GENERAL RADIOLOGY | Facility: HOSPITAL | Age: 37
End: 2017-04-16
Attending: EMERGENCY MEDICINE
Payer: COMMERCIAL

## 2017-04-16 ENCOUNTER — HOSPITAL ENCOUNTER (EMERGENCY)
Facility: HOSPITAL | Age: 37
Discharge: HOME | End: 2017-04-16
Attending: EMERGENCY MEDICINE
Payer: COMMERCIAL

## 2017-04-16 ENCOUNTER — APPOINTMENT (OUTPATIENT)
Dept: ULTRASOUND IMAGING | Facility: HOSPITAL | Age: 37
End: 2017-04-16
Attending: EMERGENCY MEDICINE
Payer: COMMERCIAL

## 2017-04-16 VITALS
RESPIRATION RATE: 16 BRPM | TEMPERATURE: 98 F | HEART RATE: 96 BPM | DIASTOLIC BLOOD PRESSURE: 68 MMHG | OXYGEN SATURATION: 98 % | SYSTOLIC BLOOD PRESSURE: 115 MMHG

## 2017-04-16 DIAGNOSIS — N64.4 BREAST TENDERNESS: Primary | ICD-10-CM

## 2017-04-16 DIAGNOSIS — D72.829 LEUKOCYTOSIS, UNSPECIFIED TYPE: ICD-10-CM

## 2017-04-16 DIAGNOSIS — R50.9 FEVER, UNSPECIFIED FEVER CAUSE: ICD-10-CM

## 2017-04-16 DIAGNOSIS — N39.0 URINARY TRACT INFECTION WITHOUT HEMATURIA, SITE UNSPECIFIED: ICD-10-CM

## 2017-04-16 PROCEDURE — 81025 URINE PREGNANCY TEST: CPT

## 2017-04-16 PROCEDURE — 99284 EMERGENCY DEPT VISIT MOD MDM: CPT

## 2017-04-16 PROCEDURE — 87040 BLOOD CULTURE FOR BACTERIA: CPT | Performed by: EMERGENCY MEDICINE

## 2017-04-16 PROCEDURE — 96365 THER/PROPH/DIAG IV INF INIT: CPT

## 2017-04-16 PROCEDURE — 96375 TX/PRO/DX INJ NEW DRUG ADDON: CPT

## 2017-04-16 PROCEDURE — 96361 HYDRATE IV INFUSION ADD-ON: CPT

## 2017-04-16 PROCEDURE — 71020 XR CHEST PA + LAT CHEST (CPT=71020): CPT

## 2017-04-16 PROCEDURE — 87086 URINE CULTURE/COLONY COUNT: CPT | Performed by: EMERGENCY MEDICINE

## 2017-04-16 PROCEDURE — 36415 COLL VENOUS BLD VENIPUNCTURE: CPT

## 2017-04-16 PROCEDURE — 81001 URINALYSIS AUTO W/SCOPE: CPT | Performed by: EMERGENCY MEDICINE

## 2017-04-16 PROCEDURE — 76641 ULTRASOUND BREAST COMPLETE: CPT

## 2017-04-16 PROCEDURE — 80053 COMPREHEN METABOLIC PANEL: CPT | Performed by: EMERGENCY MEDICINE

## 2017-04-16 PROCEDURE — 85025 COMPLETE CBC W/AUTO DIFF WBC: CPT | Performed by: EMERGENCY MEDICINE

## 2017-04-16 RX ORDER — MELATONIN
325
COMMUNITY
End: 2017-12-09

## 2017-04-16 RX ORDER — CEPHALEXIN 500 MG/1
500 CAPSULE ORAL 4 TIMES DAILY
Qty: 40 CAPSULE | Refills: 0 | Status: SHIPPED | OUTPATIENT
Start: 2017-04-16 | End: 2017-04-26

## 2017-04-16 RX ORDER — KETOROLAC TROMETHAMINE 30 MG/ML
30 INJECTION, SOLUTION INTRAMUSCULAR; INTRAVENOUS ONCE
Status: COMPLETED | OUTPATIENT
Start: 2017-04-16 | End: 2017-04-16

## 2017-04-16 NOTE — ED PROVIDER NOTES
Patient Seen in: [facility] Emergency Department    History   Patient presents with:  Fever Sepsis (infectious)    Stated Complaint: Fever, max temp 103.7 since yesterday, body aches, 3 weeks post partum    HPI  This is a 26-year-old female who arri Disorder Paternal Grandmother          Smoking Status: Never Smoker                      Alcohol Use: No                Review of Systems    Positive for stated complaint: Fever, max temp 103.7 since yesterday, body aches, 3 weeks post partum  Other system (14) - Abnormal; Notable for the following:     Glucose 124 (*)     AST 13 (*)     Albumin 3.2 (*)     Potassium 3.4 (*)     All other components within normal limits   URINALYSIS WITH CULTURE REFLEX - Abnormal; Notable for the following:     Clarity Urine breast showed no evidence of breast abscess within the bilateral breast.  This may be just fluid collection with from milk. The patient's chest x-ray shows no acute pathology.   The patient was given IV antibiotics I discussed with her the fever might ha

## 2017-04-16 NOTE — ED INITIAL ASSESSMENT (HPI)
Patient presents to ED with bilateral breast pain, fever, body aches. She is 3 weeks postpartum and has been exclusively breast pumping d/t poor latch.

## 2017-05-04 ENCOUNTER — OFFICE VISIT (OUTPATIENT)
Dept: OBGYN CLINIC | Facility: CLINIC | Age: 37
End: 2017-05-04

## 2017-05-04 VITALS
SYSTOLIC BLOOD PRESSURE: 110 MMHG | DIASTOLIC BLOOD PRESSURE: 60 MMHG | WEIGHT: 179 LBS | HEART RATE: 72 BPM | BODY MASS INDEX: 29.82 KG/M2 | RESPIRATION RATE: 16 BRPM | HEIGHT: 65 IN

## 2017-05-04 RX ORDER — MELATONIN
1000 DAILY
COMMUNITY
End: 2017-12-09

## 2017-05-04 NOTE — PROGRESS NOTES
SYDNI    [place] is a 39year old female  here for 6 week post-partum visit. Patient delivered a  male infant  via repeat CSx. Patient desires nothing for contraception. Patient is breast & bottle feeding.    Patient denies symptoms of depr visit:    Postpartum exam      Discussed all options of birthcontrol including ocps, minipill, Mirena or Paragard IUD, nuvaring, orthoevra patch, nexplanon, Depoprovera, condoms or tubal sterilization options. Patient has chosen rhythm.   Patient to return

## 2017-05-13 ENCOUNTER — TELEPHONE (OUTPATIENT)
Dept: OBGYN CLINIC | Facility: CLINIC | Age: 37
End: 2017-05-13

## 2017-05-13 NOTE — TELEPHONE ENCOUNTER
Per pt she needs something for her nipples, she has tried many things but they are not working. Please advise and call pt.  thanks

## 2017-12-09 ENCOUNTER — OFFICE VISIT (OUTPATIENT)
Dept: OBGYN CLINIC | Facility: CLINIC | Age: 37
End: 2017-12-09

## 2017-12-09 VITALS
DIASTOLIC BLOOD PRESSURE: 62 MMHG | HEIGHT: 65 IN | HEART RATE: 88 BPM | BODY MASS INDEX: 31.16 KG/M2 | SYSTOLIC BLOOD PRESSURE: 112 MMHG | WEIGHT: 187 LBS

## 2017-12-09 DIAGNOSIS — Z01.419 ENCOUNTER FOR WELL WOMAN EXAM WITH ROUTINE GYNECOLOGICAL EXAM: Primary | ICD-10-CM

## 2017-12-09 PROCEDURE — 88175 CYTOPATH C/V AUTO FLUID REDO: CPT | Performed by: OBSTETRICS & GYNECOLOGY

## 2017-12-09 PROCEDURE — 99395 PREV VISIT EST AGE 18-39: CPT | Performed by: OBSTETRICS & GYNECOLOGY

## 2017-12-09 PROCEDURE — 87624 HPV HI-RISK TYP POOLED RSLT: CPT | Performed by: OBSTETRICS & GYNECOLOGY

## 2017-12-09 NOTE — PROGRESS NOTES
Vanessa Malik is a 44year old female [id number] Patient's last menstrual period was 10/26/2017 (exact date). Patient presents with:  Wellness Visit  . Menses irregular, patient is still breastfeeding 10 month old boy, trying to wean off, declines B.C.    OB Disp: , Rfl:   •  Ascorbic Acid (VITAMIN C OR), Take by mouth., Disp: , Rfl:   •  Cholecalciferol (VITAMIN D) 1000 units Oral Tab, Take by mouth., Disp: , Rfl:   •  Hkgdrw-SmEpw-Akgtj-FA-DHA w/oA (VITAPEARL) 30-1.4-200 MG Oral Cap CR, , Disp: , Rfl: 10 motion  Uterus: normal in size, contour, position, mobility, without tenderness  Adnexa: normal without masses or tenderness  Perineum: normal  Anus: no hemorroids     Assessment & Plan:  Diagnoses and all orders for this visit:    Encounter for well woman

## 2017-12-30 ENCOUNTER — TELEPHONE (OUTPATIENT)
Dept: OBGYN CLINIC | Facility: CLINIC | Age: 37
End: 2017-12-30

## 2017-12-30 NOTE — TELEPHONE ENCOUNTER
Pt called with symptoms of mastitis. 4th one since delivery 9 mos ago. Denies fever. Need to follow up office next week. dicloxicillin 500mg QID 10 days to pharmacy.  Pt notified

## 2018-05-05 ENCOUNTER — OFFICE VISIT (OUTPATIENT)
Dept: OBGYN CLINIC | Facility: CLINIC | Age: 38
End: 2018-05-05

## 2018-05-05 VITALS
DIASTOLIC BLOOD PRESSURE: 70 MMHG | WEIGHT: 179 LBS | RESPIRATION RATE: 18 BRPM | HEIGHT: 65 IN | BODY MASS INDEX: 29.82 KG/M2 | HEART RATE: 88 BPM | SYSTOLIC BLOOD PRESSURE: 128 MMHG

## 2018-05-05 DIAGNOSIS — N94.6 DYSMENORRHEA: ICD-10-CM

## 2018-05-05 DIAGNOSIS — R10.2 PELVIC PAIN: Primary | ICD-10-CM

## 2018-05-05 PROCEDURE — 99213 OFFICE O/P EST LOW 20 MIN: CPT | Performed by: OBSTETRICS & GYNECOLOGY

## 2018-05-05 RX ORDER — ACETAMINOPHEN AND CODEINE PHOSPHATE 120; 12 MG/5ML; MG/5ML
0.35 SOLUTION ORAL DAILY
Qty: 28 TABLET | Refills: 11 | Status: SHIPPED | OUTPATIENT
Start: 2018-05-05 | End: 2018-06-02

## 2018-05-07 NOTE — PROGRESS NOTES
Vanessa Malik is a 44year old female [id number] Patient's last menstrual period was 04/21/2018 (exact date). Patient presents with:  Vaginal Problem: pt is having worsening endometriosis symptoms. Low back pain, abd pain, fever, and body aches, nausea  . MEDICATIONS:    Current Outpatient Prescriptions:   •  Norethindrone (KARTHIKEYAN) 0.35 MG Oral Tab, Take 1 tablet (0.35 mg total) by mouth daily. , Disp: 28 tablet, Rfl: 11  •  Cyanocobalamin (VITAMIN B 12 OR), Take by mouth., Disp: , Rfl:   •  Ascorbic

## 2018-06-08 ENCOUNTER — APPOINTMENT (OUTPATIENT)
Dept: OBGYN CLINIC | Facility: CLINIC | Age: 38
End: 2018-06-08

## 2018-06-08 PROCEDURE — 76856 US EXAM PELVIC COMPLETE: CPT | Performed by: OBSTETRICS & GYNECOLOGY

## 2018-06-08 PROCEDURE — 76830 TRANSVAGINAL US NON-OB: CPT | Performed by: OBSTETRICS & GYNECOLOGY

## 2018-06-15 ENCOUNTER — TELEPHONE (OUTPATIENT)
Dept: OBGYN CLINIC | Facility: CLINIC | Age: 38
End: 2018-06-15

## 2018-06-16 DIAGNOSIS — R10.2 PELVIC PAIN: Primary | ICD-10-CM

## 2018-10-24 ENCOUNTER — HOSPITAL ENCOUNTER (OUTPATIENT)
Dept: GENERAL RADIOLOGY | Age: 38
Discharge: HOME | End: 2018-10-24
Attending: FAMILY MEDICINE
Payer: COMMERCIAL

## 2018-10-24 DIAGNOSIS — IMO0001: ICD-10-CM

## 2018-10-24 PROCEDURE — 73140 X-RAY EXAM OF FINGER(S): CPT | Performed by: FAMILY MEDICINE

## 2019-04-22 RX ORDER — ACETAMINOPHEN AND CODEINE PHOSPHATE 120; 12 MG/5ML; MG/5ML
SOLUTION ORAL
Qty: 28 TABLET | Refills: 0 | OUTPATIENT
Start: 2019-04-22

## 2019-05-07 ENCOUNTER — LAB ENCOUNTER (OUTPATIENT)
Dept: LAB | Facility: HOSPITAL | Age: 39
End: 2019-05-07
Attending: NURSE PRACTITIONER
Payer: COMMERCIAL

## 2019-05-07 DIAGNOSIS — Z20.1 TUBERCULOSIS EXPOSURE: Primary | ICD-10-CM

## 2019-05-07 PROCEDURE — 36415 COLL VENOUS BLD VENIPUNCTURE: CPT

## 2019-05-07 PROCEDURE — 86480 TB TEST CELL IMMUN MEASURE: CPT

## 2019-08-31 ENCOUNTER — OFFICE VISIT (OUTPATIENT)
Dept: OBGYN CLINIC | Facility: CLINIC | Age: 39
End: 2019-08-31

## 2019-08-31 VITALS
WEIGHT: 166 LBS | BODY MASS INDEX: 27.66 KG/M2 | HEIGHT: 65 IN | SYSTOLIC BLOOD PRESSURE: 98 MMHG | DIASTOLIC BLOOD PRESSURE: 68 MMHG | HEART RATE: 74 BPM

## 2019-08-31 DIAGNOSIS — Z01.411 ENCOUNTER FOR WELL WOMAN EXAM WITH ABNORMAL FINDINGS: Primary | ICD-10-CM

## 2019-08-31 DIAGNOSIS — E66.3 OVERWEIGHT (BMI 25.0-29.9): ICD-10-CM

## 2019-08-31 DIAGNOSIS — R23.2 HOT FLASHES: ICD-10-CM

## 2019-08-31 DIAGNOSIS — R68.82 LOW LIBIDO: ICD-10-CM

## 2019-08-31 DIAGNOSIS — Z12.39 SCREENING FOR BREAST CANCER: ICD-10-CM

## 2019-08-31 DIAGNOSIS — N92.6 IRREGULAR PERIODS: ICD-10-CM

## 2019-08-31 PROCEDURE — 99213 OFFICE O/P EST LOW 20 MIN: CPT | Performed by: OBSTETRICS & GYNECOLOGY

## 2019-08-31 PROCEDURE — 87624 HPV HI-RISK TYP POOLED RSLT: CPT | Performed by: OBSTETRICS & GYNECOLOGY

## 2019-08-31 PROCEDURE — 99395 PREV VISIT EST AGE 18-39: CPT | Performed by: OBSTETRICS & GYNECOLOGY

## 2019-08-31 PROCEDURE — 88175 CYTOPATH C/V AUTO FLUID REDO: CPT | Performed by: OBSTETRICS & GYNECOLOGY

## 2019-08-31 RX ORDER — ACETAMINOPHEN AND CODEINE PHOSPHATE 120; 12 MG/5ML; MG/5ML
0.35 SOLUTION ORAL DAILY
Qty: 3 PACKAGE | Refills: 3 | Status: SHIPPED | OUTPATIENT
Start: 2019-08-31 | End: 2019-09-28

## 2019-08-31 NOTE — H&P
[address] [facility]  Obstetrics and Gynecology   History & Physical    Chief complaint: Patient presents with:  Wellness Visit: Pt has had irregular periods since February. Pt has been off her oral contraceptive since November.  Pt also has endometriosis ( Multiple0  Live Births2   OB History    Para Term  AB Living   2 2 2     2   SAB TAB Ectopic Multiple Live Births         0 2      # Outcome Date GA Lbr Manoj/2nd Weight Sex Delivery Anes PTL Lv   2 Term 17 40w4d  8 lb 8 oz M CS-LTranv EP on file      Highest education level: Not on file    Occupational History      Not on file    Social Needs      Financial resource strain: Not on file      Food insecurity:        Worry: Not on file        Inability: Not on file      Transportation needs: Review of Systems:  Review of Systems   Constitutional: Positive for fatigue. HENT:        Wears glasses   Eyes: Negative. Respiratory: Negative. Cardiovascular: Negative. Gastrointestinal: Negative.     Genitourinary: Positive for vaginal Component Value Date    WBC 15.1 (H) 04/16/2017    RBC 3.66 (L) 04/16/2017    HGB 10.6 (L) 04/16/2017    HCT 32.2 (L) 04/16/2017    MCV 88.0 04/16/2017    MCH 29.0 04/16/2017    MCHC 32.9 04/16/2017    RDW 13.9 04/16/2017    .0 04/16/2017    MPV 9 discussed. Encourage lubricant & masturbation, work on relationship, time with .  with some erectile dysfunction & weight gain. He's working on losing weight. He is at the gym.   Encouraged her to have him to see his doctor to help him wit

## 2019-09-02 PROBLEM — N92.6 IRREGULAR PERIODS: Status: ACTIVE | Noted: 2019-09-02

## 2019-09-02 PROBLEM — R23.2 HOT FLASHES: Status: ACTIVE | Noted: 2019-09-02

## 2019-09-02 PROBLEM — E66.3 OVERWEIGHT (BMI 25.0-29.9): Status: ACTIVE | Noted: 2019-09-02

## 2019-09-02 PROBLEM — R68.82 LOW LIBIDO: Status: ACTIVE | Noted: 2019-09-02

## 2019-09-03 LAB — HPV I/H RISK 1 DNA SPEC QL NAA+PROBE: NEGATIVE

## 2020-06-26 ENCOUNTER — TELEPHONE (OUTPATIENT)
Dept: OBGYN CLINIC | Facility: CLINIC | Age: 40
End: 2020-06-26

## 2020-06-26 NOTE — TELEPHONE ENCOUNTER
Per pt she has been having a breast problem since around a month ago and she has been havng a little lump and last Monday & Sunday was the worse.  She did some massaging on her breast with her electric toothbrush, she has taken warm showers, has done expres

## 2020-06-26 NOTE — TELEPHONE ENCOUNTER
Patient states she has noticed a lump in her right breast, tender to touch, when massaging the lump she is able to express breast milk (patient stopped breastfeeding two years ago). Patient instructed to come to office for evaluation.  Appointment offered o

## 2020-07-11 ENCOUNTER — OFFICE VISIT (OUTPATIENT)
Dept: OBGYN CLINIC | Facility: CLINIC | Age: 40
End: 2020-07-11

## 2020-07-11 VITALS
WEIGHT: 165 LBS | DIASTOLIC BLOOD PRESSURE: 60 MMHG | SYSTOLIC BLOOD PRESSURE: 110 MMHG | HEIGHT: 65 IN | BODY MASS INDEX: 27.49 KG/M2

## 2020-07-11 DIAGNOSIS — N92.6 IRREGULAR BLEEDING: ICD-10-CM

## 2020-07-11 DIAGNOSIS — N64.3 GALACTORRHEA OF RIGHT BREAST: ICD-10-CM

## 2020-07-11 DIAGNOSIS — N94.6 DYSMENORRHEA: ICD-10-CM

## 2020-07-11 DIAGNOSIS — N63.10 LUMP OF RIGHT BREAST: Primary | ICD-10-CM

## 2020-07-11 DIAGNOSIS — N64.4 PAIN OF RIGHT BREAST: ICD-10-CM

## 2020-07-11 DIAGNOSIS — N92.1 MENOMETRORRHAGIA: ICD-10-CM

## 2020-07-11 LAB
CONTROL LINE PRESENT WITH A CLEAR BACKGROUND (YES/NO): YES YES/NO
PREGNANCY TEST, URINE: NEGATIVE

## 2020-07-11 PROCEDURE — 81025 URINE PREGNANCY TEST: CPT | Performed by: OBSTETRICS & GYNECOLOGY

## 2020-07-11 PROCEDURE — 99214 OFFICE O/P EST MOD 30 MIN: CPT | Performed by: OBSTETRICS & GYNECOLOGY

## 2020-07-11 RX ORDER — DROSPIRENONE AND ETHINYL ESTRADIOL 0.02-3(28)
1 KIT ORAL DAILY
COMMUNITY
End: 2020-07-11

## 2020-07-11 RX ORDER — ACETAMINOPHEN AND CODEINE PHOSPHATE 120; 12 MG/5ML; MG/5ML
0.35 SOLUTION ORAL
COMMUNITY
Start: 2019-08-31 | End: 2021-12-06

## 2020-07-11 RX ORDER — SERTRALINE HYDROCHLORIDE 25 MG/1
25 TABLET, FILM COATED ORAL DAILY
COMMUNITY
End: 2021-12-06

## 2020-07-11 NOTE — PROGRESS NOTES
[address]  Obstetrics and Gynecology     Chief complaint: Patient presents with:  Breast Pain: RT breast, pt states she was producing milk, pt did stop breastfeeding 2 1/2 yrs ago  Other: menses cycles june 12- 20  and june 27 - july 6, pt still (3.855 kg) M CS-LTranv EPI N JUAN      Complications: Temp 070.0 or greater   1 Term 06/04/07 39w0d  8 lb 8 oz (3.856 kg) M Caesarean   JUAN       Gyne History:     Patient's last menstrual period was 06/27/2020 (exact date). Irregular & painful periods. Not on file      Highest education level: Not on file    Occupational History      Not on file    Social Needs      Financial resource strain: Not on file      Food insecurity:        Worry: Not on file        Inability: Not on file      Transportation nee Grandmother    • No Known Problems Son    • No Known Problems Maternal Grandmother    • No Known Problems Maternal Grandfather    • No Known Problems Paternal Grandfather    • No Known Problems Son        Review of Systems:  Review of Systems   Constitutio patient's left. Normal sized uterus. No uterosacral ligament nodularity. No adnexal masses or tenderness. No cervical motion tenderness. Musculoskeletal:      Comments: Extremities non-tender, no edema.     Lymphadenopathy:     She has no cervical adenop endometriosis, but never had surgery to confirm this  -last pelvic US in 6/2018 was relatively unremarkable  -urine pregnancy test negative today   -will check pelvic US  -no estrogen due to migraine with aura  -reviewed can try progestin only OCP such as

## 2020-08-07 ENCOUNTER — HOSPITAL ENCOUNTER (OUTPATIENT)
Dept: MAMMOGRAPHY | Facility: HOSPITAL | Age: 40
Discharge: HOME | End: 2020-08-07
Attending: OBSTETRICS & GYNECOLOGY
Payer: COMMERCIAL

## 2020-08-07 ENCOUNTER — HOSPITAL ENCOUNTER (OUTPATIENT)
Dept: ULTRASOUND IMAGING | Age: 40
Discharge: HOME | End: 2020-08-07
Attending: OBSTETRICS & GYNECOLOGY
Payer: COMMERCIAL

## 2020-08-07 DIAGNOSIS — N63.10 LUMP OF RIGHT BREAST: ICD-10-CM

## 2020-08-07 DIAGNOSIS — N64.4 PAIN OF RIGHT BREAST: ICD-10-CM

## 2020-08-07 DIAGNOSIS — N64.3 GALACTORRHEA OF RIGHT BREAST: ICD-10-CM

## 2020-08-07 DIAGNOSIS — N94.6 DYSMENORRHEA: ICD-10-CM

## 2020-08-07 DIAGNOSIS — N92.1 MENOMETRORRHAGIA: ICD-10-CM

## 2020-08-07 PROCEDURE — 77066 DX MAMMO INCL CAD BI: CPT | Performed by: OBSTETRICS & GYNECOLOGY

## 2020-08-07 PROCEDURE — 77062 BREAST TOMOSYNTHESIS BI: CPT | Performed by: OBSTETRICS & GYNECOLOGY

## 2020-08-07 PROCEDURE — 76830 TRANSVAGINAL US NON-OB: CPT | Performed by: OBSTETRICS & GYNECOLOGY

## 2020-08-07 PROCEDURE — 76642 ULTRASOUND BREAST LIMITED: CPT | Performed by: OBSTETRICS & GYNECOLOGY

## 2020-08-07 PROCEDURE — 76856 US EXAM PELVIC COMPLETE: CPT | Performed by: OBSTETRICS & GYNECOLOGY

## 2020-08-08 NOTE — PROGRESS NOTES
Patient aware of results and Dr. Jeronimo  recommendations. Dense breasts. Right breast ultrasound with mildly dilated duct behind the areola, but nothing suspicious noted. Repeat mammogram 1 year.

## 2020-08-11 PROBLEM — N64.3 GALACTORRHEA: Status: ACTIVE | Noted: 2020-07-11

## 2020-08-11 PROBLEM — N83.201 RIGHT OVARIAN CYST: Status: ACTIVE | Noted: 2020-08-07

## 2020-08-12 DIAGNOSIS — N94.6 DYSMENORRHEA: ICD-10-CM

## 2020-08-12 DIAGNOSIS — N83.201 RIGHT OVARIAN CYST: Primary | ICD-10-CM

## 2020-08-12 DIAGNOSIS — N92.1 MENOMETRORRHAGIA: ICD-10-CM

## 2020-08-12 NOTE — PROGRESS NOTES
Contacted patient. Reported results and provided instructions for lab work and repeat u/s. Questions answered and patient states understanding.  Order placed for f/u u/s as well as labs, because patient does not think she will be getting another menses prio

## 2020-09-05 ENCOUNTER — LAB ENCOUNTER (OUTPATIENT)
Dept: LAB | Age: 40
End: 2020-09-05
Attending: OBSTETRICS & GYNECOLOGY
Payer: COMMERCIAL

## 2020-09-05 DIAGNOSIS — N94.6 DYSMENORRHEA: ICD-10-CM

## 2020-09-05 DIAGNOSIS — N92.1 MENOMETRORRHAGIA: ICD-10-CM

## 2020-09-05 LAB
ESTRADIOL SERPL-MCNC: 22.6 PG/ML
FSH SERPL-ACNC: 13.1 MIU/ML
PROGEST SERPL-MCNC: 1.33 NG/ML
PROLACTIN SERPL-MCNC: 16.9 NG/ML

## 2020-09-05 PROCEDURE — 84146 ASSAY OF PROLACTIN: CPT

## 2020-09-05 PROCEDURE — 82670 ASSAY OF TOTAL ESTRADIOL: CPT

## 2020-09-05 PROCEDURE — 36415 COLL VENOUS BLD VENIPUNCTURE: CPT

## 2020-09-05 PROCEDURE — 84144 ASSAY OF PROGESTERONE: CPT

## 2020-09-05 PROCEDURE — 83001 ASSAY OF GONADOTROPIN (FSH): CPT

## 2020-09-08 NOTE — PROGRESS NOTES
Patient aware of blood work results. Patient wants to know what she can do to help with her symptoms (hot flashes, mood swing).  Message routed to MD. Patient verbalizes understanding

## 2020-09-16 NOTE — PROGRESS NOTES
Patient aware of the following recommendations from Dr. Bonnie Espinoza:  Can try low dose paroxetine or venlafaxine for hot flashes, etc. She can also try Relizen (through [facility]) if she wants to try something herbal.     Meditation, yoga, deep breathing, aer

## 2020-10-05 ENCOUNTER — TELEPHONE (OUTPATIENT)
Dept: OBGYN CLINIC | Facility: CLINIC | Age: 40
End: 2020-10-05

## 2020-10-05 NOTE — TELEPHONE ENCOUNTER
Spoke with patient. She has been resting all day and does not feel she need to go to the ER. Pain is still there but as long as she is resting it is tolerable.    Discussed with her taking her Slynd continuously as to not have periods in hopes to decrease t

## 2020-10-05 NOTE — TELEPHONE ENCOUNTER
Spoke with patient. She had to call off of work today due to her period pain. Bleeding is heavy but not saturating a pad and hour. She is not sure if it is from the endometriosis, her cyst or the period itself. Requesting something to help with the pain.  I

## 2020-11-22 ENCOUNTER — HOSPITAL ENCOUNTER (OUTPATIENT)
Age: 40
Discharge: HOME | End: 2020-11-22
Payer: COMMERCIAL

## 2020-11-22 VITALS
SYSTOLIC BLOOD PRESSURE: 140 MMHG | HEART RATE: 81 BPM | BODY MASS INDEX: 26.66 KG/M2 | OXYGEN SATURATION: 98 % | HEIGHT: 65 IN | DIASTOLIC BLOOD PRESSURE: 82 MMHG | RESPIRATION RATE: 20 BRPM | TEMPERATURE: 98 F | WEIGHT: 160 LBS

## 2020-11-22 DIAGNOSIS — J01.10 ACUTE NON-RECURRENT FRONTAL SINUSITIS: ICD-10-CM

## 2020-11-22 DIAGNOSIS — B97.89 SORE THROAT (VIRAL): ICD-10-CM

## 2020-11-22 DIAGNOSIS — Z20.822 ENCOUNTER FOR LABORATORY TESTING FOR COVID-19 VIRUS: Primary | ICD-10-CM

## 2020-11-22 DIAGNOSIS — J02.8 SORE THROAT (VIRAL): ICD-10-CM

## 2020-11-22 PROCEDURE — 99213 OFFICE O/P EST LOW 20 MIN: CPT

## 2020-11-22 PROCEDURE — 99203 OFFICE O/P NEW LOW 30 MIN: CPT

## 2020-11-22 NOTE — ED PROVIDER NOTES
Patient Seen in: [facility]      History   Patient presents with:  Testing    Stated Complaint: Testing- Cough, sore throat, body aches    Patient presents to immediate care for COVID testing.   Patient states they have had a positive expo Negative. Psychiatric/Behavioral: Negative. Positive for stated complaint: Testing- Cough, sore throat, body aches  Other systems are as noted in HPI. Constitutional and vital signs reviewed.       All other systems reviewed and negative except a and Plan     Clinical Impression:  Encounter for laboratory testing for COVID-19 virus  (primary encounter diagnosis)  Acute non-recurrent frontal sinusitis  Sore throat (viral)    Disposition:  Discharge  11/22/2020  2:49 pm    Follow-up:  ТАТЬЯНА Mendoza

## 2020-11-22 NOTE — ED INITIAL ASSESSMENT (HPI)
Exposed to covid last week; pt with sore throat, R ear pain, headache, body aches, a little cough since wed    No fever/jaguar

## 2021-04-01 DIAGNOSIS — Z23 NEED FOR VACCINATION: ICD-10-CM

## 2021-06-07 DIAGNOSIS — N92.1 MENOMETRORRHAGIA: ICD-10-CM

## 2021-06-07 DIAGNOSIS — N94.6 DYSMENORRHEA: ICD-10-CM

## 2021-06-07 RX ORDER — DROSPIRENONE 4 MG/1
TABLET, FILM COATED ORAL
Qty: 84 TABLET | Refills: 0 | Status: SHIPPED | OUTPATIENT
Start: 2021-06-07 | End: 2021-09-02

## 2021-09-02 DIAGNOSIS — N94.6 DYSMENORRHEA: ICD-10-CM

## 2021-09-02 DIAGNOSIS — N92.1 MENOMETRORRHAGIA: ICD-10-CM

## 2021-09-02 RX ORDER — DROSPIRENONE 4 MG/1
TABLET, FILM COATED ORAL
Qty: 84 TABLET | Refills: 0 | Status: SHIPPED | OUTPATIENT
Start: 2021-09-02 | End: 2021-12-06

## 2021-09-05 DIAGNOSIS — N94.6 DYSMENORRHEA: ICD-10-CM

## 2021-09-05 DIAGNOSIS — N92.1 MENOMETRORRHAGIA: ICD-10-CM

## 2021-09-05 RX ORDER — DROSPIRENONE 4 MG/1
TABLET, FILM COATED ORAL
Qty: 84 TABLET | Refills: 0 | OUTPATIENT
Start: 2021-09-05

## 2021-10-06 PROBLEM — F40.01 PANIC DISORDER WITH AGORAPHOBIA: Status: ACTIVE | Noted: 2021-10-06

## 2021-10-06 PROBLEM — F41.1 GENERALIZED ANXIETY DISORDER: Status: ACTIVE | Noted: 2021-10-06

## 2021-10-06 PROBLEM — F33.1 MAJOR DEPRESSIVE DISORDER, RECURRENT EPISODE, MODERATE (HCC): Status: ACTIVE | Noted: 2021-10-06

## 2021-11-05 DIAGNOSIS — N92.1 MENOMETRORRHAGIA: ICD-10-CM

## 2021-11-05 DIAGNOSIS — N94.6 DYSMENORRHEA: ICD-10-CM

## 2021-11-05 RX ORDER — DROSPIRENONE 4 MG/1
TABLET, FILM COATED ORAL
Qty: 84 TABLET | Refills: 0 | OUTPATIENT
Start: 2021-11-05

## 2021-12-06 ENCOUNTER — OFFICE VISIT (OUTPATIENT)
Dept: OBGYN CLINIC | Facility: CLINIC | Age: 41
End: 2021-12-06

## 2021-12-06 VITALS
BODY MASS INDEX: 31.09 KG/M2 | HEIGHT: 65 IN | WEIGHT: 186.63 LBS | SYSTOLIC BLOOD PRESSURE: 110 MMHG | DIASTOLIC BLOOD PRESSURE: 60 MMHG

## 2021-12-06 DIAGNOSIS — Z12.4 SCREENING FOR CERVICAL CANCER: ICD-10-CM

## 2021-12-06 DIAGNOSIS — Z12.31 ENCOUNTER FOR SCREENING MAMMOGRAM FOR MALIGNANT NEOPLASM OF BREAST: ICD-10-CM

## 2021-12-06 DIAGNOSIS — R39.14 FEELING OF INCOMPLETE BLADDER EMPTYING: ICD-10-CM

## 2021-12-06 DIAGNOSIS — Z01.411 ENCOUNTER FOR WELL WOMAN EXAM WITH ABNORMAL FINDINGS: Primary | ICD-10-CM

## 2021-12-06 DIAGNOSIS — N92.6 IRREGULAR PERIODS: ICD-10-CM

## 2021-12-06 DIAGNOSIS — N83.201 RIGHT OVARIAN CYST: ICD-10-CM

## 2021-12-06 DIAGNOSIS — M62.89 PELVIC FLOOR DYSFUNCTION IN FEMALE: ICD-10-CM

## 2021-12-06 DIAGNOSIS — N80.9 ENDOMETRIOSIS: ICD-10-CM

## 2021-12-06 PROCEDURE — 87624 HPV HI-RISK TYP POOLED RSLT: CPT | Performed by: OBSTETRICS & GYNECOLOGY

## 2021-12-06 PROCEDURE — 3008F BODY MASS INDEX DOCD: CPT | Performed by: OBSTETRICS & GYNECOLOGY

## 2021-12-06 PROCEDURE — 3074F SYST BP LT 130 MM HG: CPT | Performed by: OBSTETRICS & GYNECOLOGY

## 2021-12-06 PROCEDURE — 99396 PREV VISIT EST AGE 40-64: CPT | Performed by: OBSTETRICS & GYNECOLOGY

## 2021-12-06 PROCEDURE — 3078F DIAST BP <80 MM HG: CPT | Performed by: OBSTETRICS & GYNECOLOGY

## 2021-12-06 PROCEDURE — 88175 CYTOPATH C/V AUTO FLUID REDO: CPT | Performed by: OBSTETRICS & GYNECOLOGY

## 2021-12-06 PROCEDURE — 99214 OFFICE O/P EST MOD 30 MIN: CPT | Performed by: OBSTETRICS & GYNECOLOGY

## 2021-12-06 NOTE — H&P
[facility]  Obstetrics and Gynecology     Chief complaint: Patient presents with:  Wellness Visit  Other: c/o emotionality prior to periods though not having periods on Slynd but still gets a few days of this every month.      Subjective:     HPI: Hx  + abnormal pap around 2014-15.   +colposcopy around 2014-15   No history of LEEP/conization.    Pap & HPV neg 8/31/19      C/o menses irregular q 2 wk & painful. +premenstrual mood swings   H/o endometriosis & PHILLIP  8/7/2020 Pelvic US - 1.8 cm left uteri series. • Hyperlipidemia 2019   • Infertility associated with anovulation     IVF   • Migraine with aura     No estrogen   • Post partum depression    • Subserosal leiomyoma of uterus 08/07/2020    Incidental 1.8 cm left fundal submucous fibroid.     • V of Onset   • Cancer Father 67        LEUKEMIA   • Hypertension Mother    • Diabetes Paternal Grandmother    • Heart Disorder Paternal Grandmother    • No Known Problems Son    • No Known Problems Maternal Grandmother    • No Known Problems Maternal [place] are normal.   Neck: No thyromegaly present. Cardiovascular: Normal rate and regular rhythm. Exam reveals no gallop and no friction rub. No murmur heard. Edema not present. Pulmonary/Chest: Breath sounds normal. She has no wheezes.  She has no rales ONLY    Screening for cervical cancer  -     THINPREP PAP SMEAR B; Future  -     HPV HIGH RISK , THIN PREP COLLECTION;  Future  -     THINPREP PAP SMEAR ONLY    Encounter for screening mammogram for malignant neoplasm of breast  -     [facility] FARHAT 2D+3D SCREENI floor dysfunction  -pelvic floor PT referral encouraged & placed    Low libido  -has had for years, h/o some dyspareunia & also struggles with mental health/severe anxiety.    -reviewed that these are multiple reasons for low libido and not easily fixed wit

## 2021-12-07 ENCOUNTER — TELEPHONE (OUTPATIENT)
Dept: OBGYN CLINIC | Facility: CLINIC | Age: 41
End: 2021-12-07

## 2021-12-07 PROBLEM — R39.14 FEELING OF INCOMPLETE BLADDER EMPTYING: Status: ACTIVE | Noted: 2021-12-07

## 2021-12-07 PROBLEM — N80.9 ENDOMETRIOSIS: Status: ACTIVE | Noted: 2021-12-07

## 2021-12-07 PROBLEM — M62.89 PELVIC FLOOR DYSFUNCTION IN FEMALE: Status: ACTIVE | Noted: 2021-12-07

## 2022-04-25 PROBLEM — R68.82 LOW LIBIDO: Status: RESOLVED | Noted: 2019-09-02 | Resolved: 2022-04-25

## 2022-04-30 ENCOUNTER — HOSPITAL ENCOUNTER (OUTPATIENT)
Dept: MAMMOGRAPHY | Age: 42
Discharge: HOME | End: 2022-04-30
Attending: OBSTETRICS & GYNECOLOGY
Payer: COMMERCIAL

## 2022-04-30 DIAGNOSIS — Z12.31 ENCOUNTER FOR SCREENING MAMMOGRAM FOR MALIGNANT NEOPLASM OF BREAST: ICD-10-CM

## 2022-04-30 PROCEDURE — 77067 SCR MAMMO BI INCL CAD: CPT | Performed by: OBSTETRICS & GYNECOLOGY

## 2022-04-30 PROCEDURE — 77063 BREAST TOMOSYNTHESIS BI: CPT | Performed by: OBSTETRICS & GYNECOLOGY

## 2022-05-01 PROBLEM — R92.30 DENSE BREASTS: Status: ACTIVE | Noted: 2021-04-01

## 2022-05-01 PROBLEM — R92.2 DENSE BREASTS: Status: ACTIVE | Noted: 2021-04-01

## 2022-05-19 ENCOUNTER — HOSPITAL ENCOUNTER (OUTPATIENT)
Dept: MAMMOGRAPHY | Facility: HOSPITAL | Age: 42
Discharge: HOME | End: 2022-05-19
Attending: OBSTETRICS & GYNECOLOGY
Payer: COMMERCIAL

## 2022-05-19 DIAGNOSIS — R92.8 ABNORMAL MAMMOGRAM: ICD-10-CM

## 2022-05-19 PROCEDURE — 77065 DX MAMMO INCL CAD UNI: CPT | Performed by: OBSTETRICS & GYNECOLOGY

## 2022-05-19 PROCEDURE — 77061 BREAST TOMOSYNTHESIS UNI: CPT | Performed by: OBSTETRICS & GYNECOLOGY

## 2022-05-19 PROCEDURE — 76642 ULTRASOUND BREAST LIMITED: CPT | Performed by: OBSTETRICS & GYNECOLOGY

## 2022-06-08 ENCOUNTER — HOSPITAL ENCOUNTER (OUTPATIENT)
Dept: ULTRASOUND IMAGING | Age: 42
Discharge: HOME | End: 2022-06-08
Attending: OBSTETRICS & GYNECOLOGY
Payer: COMMERCIAL

## 2022-06-08 DIAGNOSIS — M62.89 PELVIC FLOOR DYSFUNCTION IN FEMALE: ICD-10-CM

## 2022-06-08 DIAGNOSIS — N83.201 RIGHT OVARIAN CYST: ICD-10-CM

## 2022-06-08 DIAGNOSIS — N80.9 ENDOMETRIOSIS: ICD-10-CM

## 2022-06-08 DIAGNOSIS — N92.6 IRREGULAR PERIODS: ICD-10-CM

## 2022-06-08 PROCEDURE — 76830 TRANSVAGINAL US NON-OB: CPT | Performed by: OBSTETRICS & GYNECOLOGY

## 2022-06-08 PROCEDURE — 76856 US EXAM PELVIC COMPLETE: CPT | Performed by: OBSTETRICS & GYNECOLOGY

## 2022-07-01 PROBLEM — N83.201 RIGHT OVARIAN CYST: Status: RESOLVED | Noted: 2020-08-07 | Resolved: 2022-07-01

## 2022-09-12 ENCOUNTER — TELEPHONE (OUTPATIENT)
Dept: SCHEDULING | Age: 42
End: 2022-09-12

## 2023-02-09 ENCOUNTER — OFFICE VISIT (OUTPATIENT)
Dept: INTERNAL MEDICINE CLINIC | Facility: CLINIC | Age: 43
End: 2023-02-09
Payer: COMMERCIAL

## 2023-02-09 VITALS
SYSTOLIC BLOOD PRESSURE: 130 MMHG | TEMPERATURE: 99 F | WEIGHT: 207.38 LBS | DIASTOLIC BLOOD PRESSURE: 80 MMHG | RESPIRATION RATE: 20 BRPM | BODY MASS INDEX: 34.55 KG/M2 | HEART RATE: 92 BPM | OXYGEN SATURATION: 99 % | HEIGHT: 65 IN

## 2023-02-09 DIAGNOSIS — E55.9 VITAMIN D DEFICIENCY: ICD-10-CM

## 2023-02-09 DIAGNOSIS — E78.2 MIXED HYPERLIPIDEMIA: Primary | ICD-10-CM

## 2023-02-09 DIAGNOSIS — M79.7 FIBROMYALGIA: ICD-10-CM

## 2023-02-09 DIAGNOSIS — F41.1 GENERALIZED ANXIETY DISORDER: ICD-10-CM

## 2023-02-09 DIAGNOSIS — J98.8 RESPIRATORY INFECTION: ICD-10-CM

## 2023-02-09 DIAGNOSIS — R03.0 ELEVATED BLOOD PRESSURE READING: ICD-10-CM

## 2023-02-09 DIAGNOSIS — F33.1 MAJOR DEPRESSIVE DISORDER, RECURRENT EPISODE, MODERATE (HCC): ICD-10-CM

## 2023-02-09 DIAGNOSIS — E66.9 OBESITY, CLASS I, BMI 30-34.9: ICD-10-CM

## 2023-02-09 DIAGNOSIS — G43.109 MIGRAINE WITH AURA AND WITHOUT STATUS MIGRAINOSUS, NOT INTRACTABLE: ICD-10-CM

## 2023-02-09 PROBLEM — M26.609 TMJ DYSFUNCTION: Status: ACTIVE | Noted: 2022-06-30

## 2023-02-09 PROBLEM — G43.909 MIGRAINE: Status: ACTIVE | Noted: 2023-02-09

## 2023-02-09 PROBLEM — H93.13 TINNITUS OF BOTH EARS: Status: ACTIVE | Noted: 2022-06-30

## 2023-02-09 PROCEDURE — 3075F SYST BP GE 130 - 139MM HG: CPT | Performed by: INTERNAL MEDICINE

## 2023-02-09 PROCEDURE — 3008F BODY MASS INDEX DOCD: CPT | Performed by: INTERNAL MEDICINE

## 2023-02-09 PROCEDURE — 3079F DIAST BP 80-89 MM HG: CPT | Performed by: INTERNAL MEDICINE

## 2023-02-09 PROCEDURE — 99204 OFFICE O/P NEW MOD 45 MIN: CPT | Performed by: INTERNAL MEDICINE

## 2023-02-09 RX ORDER — AZITHROMYCIN 250 MG/1
TABLET, FILM COATED ORAL
Qty: 6 TABLET | Refills: 0 | Status: SHIPPED | OUTPATIENT
Start: 2023-02-09 | End: 2023-02-14

## 2023-02-09 RX ORDER — RIZATRIPTAN BENZOATE 10 MG/1
10 TABLET ORAL DAILY PRN
COMMUNITY
Start: 2022-08-16

## 2023-02-09 NOTE — PATIENT INSTRUCTIONS
- Get blood tests done when fasting  - Schedule appointment for x-ray of knee and lower back  - Follow up with Dr. Davis/Rheumatology for fibromyalgia:  [id number] [facility], [address] [place], [place] [address] [phone]    - Follow up with Dr. Johnson/Neurology for migraines  [address]  [facility], [address]  [phone]  She also comes to our office every Thursday. - Will see how blood work looks as far as weight gain. - Repeat blood pressure reading was better. Will monitor for now. Check several times a week at home, write down readings, and follow up/bring in log in 1 month. It was a pleasure seeing you in the clinic today. Thank you for choosing the [facility] office for your healthcare needs. Please call at [phone] with any questions or concerns.     Porfirio Zaidi MD

## 2023-02-10 PROBLEM — E66.811 OBESITY, CLASS I, BMI 30-34.9: Status: ACTIVE | Noted: 2019-09-02

## 2023-02-10 PROBLEM — E78.2 MIXED HYPERLIPIDEMIA: Chronic | Status: ACTIVE | Noted: 2023-02-09

## 2023-02-10 PROBLEM — E66.9 OBESITY, CLASS I, BMI 30-34.9: Status: ACTIVE | Noted: 2019-09-02

## 2023-02-10 PROBLEM — G43.909 MIGRAINE: Chronic | Status: ACTIVE | Noted: 2023-02-09

## 2023-02-10 PROBLEM — F41.1 GENERALIZED ANXIETY DISORDER: Chronic | Status: ACTIVE | Noted: 2021-10-06

## 2023-02-10 PROBLEM — F33.1 MAJOR DEPRESSIVE DISORDER, RECURRENT EPISODE, MODERATE (HCC): Chronic | Status: ACTIVE | Noted: 2021-10-06

## 2023-03-10 ENCOUNTER — LAB ENCOUNTER (OUTPATIENT)
Dept: LAB | Age: 43
End: 2023-03-10
Attending: INTERNAL MEDICINE
Payer: COMMERCIAL

## 2023-03-10 ENCOUNTER — HOSPITAL ENCOUNTER (OUTPATIENT)
Dept: GENERAL RADIOLOGY | Age: 43
Discharge: HOME | End: 2023-03-10
Attending: INTERNAL MEDICINE
Payer: COMMERCIAL

## 2023-03-10 DIAGNOSIS — F33.1 MAJOR DEPRESSIVE DISORDER, RECURRENT EPISODE, MODERATE (HCC): ICD-10-CM

## 2023-03-10 DIAGNOSIS — M79.7 FIBROMYALGIA: ICD-10-CM

## 2023-03-10 DIAGNOSIS — E55.9 VITAMIN D DEFICIENCY: ICD-10-CM

## 2023-03-10 DIAGNOSIS — E66.9 OBESITY, CLASS I, BMI 30-34.9: ICD-10-CM

## 2023-03-10 DIAGNOSIS — E78.2 MIXED HYPERLIPIDEMIA: ICD-10-CM

## 2023-03-10 LAB
ALBUMIN SERPL-MCNC: 3.4 G/DL (ref 3.4–5)
ALBUMIN/GLOB SERPL: 0.7 {RATIO} (ref 1–2)
ALP LIVER SERPL-CCNC: 79 U/L
ALT SERPL-CCNC: 34 U/L
ANION GAP SERPL CALC-SCNC: 8 MMOL/L (ref 0–18)
AST SERPL-CCNC: 19 U/L (ref 15–37)
BASOPHILS # BLD AUTO: 0.05 X10(3) UL (ref 0–0.2)
BASOPHILS NFR BLD AUTO: 0.4 %
BILIRUB SERPL-MCNC: 0.4 MG/DL (ref 0.1–2)
BUN BLD-MCNC: 19 MG/DL (ref 7–18)
CALCIUM BLD-MCNC: 9.2 MG/DL (ref 8.5–10.1)
CHLORIDE SERPL-SCNC: 108 MMOL/L (ref 98–112)
CHOLEST SERPL-MCNC: 219 MG/DL (ref ?–200)
CO2 SERPL-SCNC: 24 MMOL/L (ref 21–32)
CREAT BLD-MCNC: 0.96 MG/DL
EOSINOPHIL # BLD AUTO: 0.07 X10(3) UL (ref 0–0.7)
EOSINOPHIL NFR BLD AUTO: 0.6 %
ERYTHROCYTE [DISTWIDTH] IN BLOOD BY AUTOMATED COUNT: 13 %
EST. AVERAGE GLUCOSE BLD GHB EST-MCNC: 114 MG/DL (ref 68–126)
FASTING PATIENT LIPID ANSWER: YES
FASTING STATUS PATIENT QL REPORTED: YES
GFR SERPLBLD BASED ON 1.73 SQ M-ARVRAT: 75 ML/MIN/1.73M2 (ref 60–?)
GLOBULIN PLAS-MCNC: 4.7 G/DL (ref 2.8–4.4)
GLUCOSE BLD-MCNC: 88 MG/DL (ref 70–99)
HBA1C MFR BLD: 5.6 % (ref ?–5.7)
HCT VFR BLD AUTO: 40.5 %
HDLC SERPL-MCNC: 68 MG/DL (ref 40–59)
HGB BLD-MCNC: 13.1 G/DL
IMM GRANULOCYTES # BLD AUTO: 0.08 X10(3) UL (ref 0–1)
IMM GRANULOCYTES NFR BLD: 0.7 %
LDLC SERPL CALC-MCNC: 133 MG/DL (ref ?–100)
LYMPHOCYTES # BLD AUTO: 2.94 X10(3) UL (ref 1–4)
LYMPHOCYTES NFR BLD AUTO: 26 %
MCH RBC QN AUTO: 27.9 PG (ref 26–34)
MCHC RBC AUTO-ENTMCNC: 32.3 G/DL (ref 31–37)
MCV RBC AUTO: 86.4 FL
MONOCYTES # BLD AUTO: 1.08 X10(3) UL (ref 0.1–1)
MONOCYTES NFR BLD AUTO: 9.6 %
NEUTROPHILS # BLD AUTO: 7.08 X10 (3) UL (ref 1.5–7.7)
NEUTROPHILS # BLD AUTO: 7.08 X10(3) UL (ref 1.5–7.7)
NEUTROPHILS NFR BLD AUTO: 62.7 %
NONHDLC SERPL-MCNC: 151 MG/DL (ref ?–130)
OSMOLALITY SERPL CALC.SUM OF ELEC: 292 MOSM/KG (ref 275–295)
PLATELET # BLD AUTO: 397 10(3)UL (ref 150–450)
POTASSIUM SERPL-SCNC: 3.7 MMOL/L (ref 3.5–5.1)
PROT SERPL-MCNC: 8.1 G/DL (ref 6.4–8.2)
RBC # BLD AUTO: 4.69 X10(6)UL
SODIUM SERPL-SCNC: 140 MMOL/L (ref 136–145)
T4 FREE SERPL-MCNC: 0.7 NG/DL (ref 0.8–1.7)
TRIGL SERPL-MCNC: 103 MG/DL (ref 30–149)
TSI SER-ACNC: 6.18 MIU/ML (ref 0.36–3.74)
VIT D+METAB SERPL-MCNC: 22.4 NG/ML (ref 30–100)
VLDLC SERPL CALC-MCNC: 19 MG/DL (ref 0–30)
WBC # BLD AUTO: 11.3 X10(3) UL (ref 4–11)

## 2023-03-10 PROCEDURE — 85025 COMPLETE CBC W/AUTO DIFF WBC: CPT

## 2023-03-10 PROCEDURE — 72110 X-RAY EXAM L-2 SPINE 4/>VWS: CPT | Performed by: INTERNAL MEDICINE

## 2023-03-10 PROCEDURE — 84443 ASSAY THYROID STIM HORMONE: CPT

## 2023-03-10 PROCEDURE — 82306 VITAMIN D 25 HYDROXY: CPT

## 2023-03-10 PROCEDURE — 80053 COMPREHEN METABOLIC PANEL: CPT

## 2023-03-10 PROCEDURE — 73562 X-RAY EXAM OF KNEE 3: CPT | Performed by: INTERNAL MEDICINE

## 2023-03-10 PROCEDURE — 84439 ASSAY OF FREE THYROXINE: CPT

## 2023-03-10 PROCEDURE — 80061 LIPID PANEL: CPT

## 2023-03-10 PROCEDURE — 36415 COLL VENOUS BLD VENIPUNCTURE: CPT

## 2023-03-10 PROCEDURE — 83036 HEMOGLOBIN GLYCOSYLATED A1C: CPT

## 2023-03-11 DIAGNOSIS — E03.9 HYPOTHYROIDISM, UNSPECIFIED TYPE: Primary | ICD-10-CM

## 2023-03-11 RX ORDER — LEVOTHYROXINE SODIUM 0.05 MG/1
50 TABLET ORAL
Qty: 90 TABLET | Refills: 0 | Status: SHIPPED | OUTPATIENT
Start: 2023-03-11

## 2023-03-13 ENCOUNTER — APPOINTMENT (OUTPATIENT)
Dept: ULTRASOUND IMAGING | Age: 43
End: 2023-03-13
Attending: EMERGENCY MEDICINE
Payer: COMMERCIAL

## 2023-03-13 ENCOUNTER — HOSPITAL ENCOUNTER (OUTPATIENT)
Age: 43
Discharge: HOME | End: 2023-03-13
Attending: EMERGENCY MEDICINE
Payer: COMMERCIAL

## 2023-03-13 VITALS
WEIGHT: 198 LBS | HEIGHT: 62 IN | TEMPERATURE: 98 F | HEART RATE: 116 BPM | SYSTOLIC BLOOD PRESSURE: 118 MMHG | BODY MASS INDEX: 36.44 KG/M2 | DIASTOLIC BLOOD PRESSURE: 86 MMHG | OXYGEN SATURATION: 97 % | RESPIRATION RATE: 18 BRPM

## 2023-03-13 DIAGNOSIS — K52.9 GASTROENTERITIS: Primary | ICD-10-CM

## 2023-03-13 DIAGNOSIS — R10.9 FLANK PAIN: ICD-10-CM

## 2023-03-13 LAB
#MXD IC: 0.6 X10ˆ3/UL (ref 0.1–1)
ALBUMIN SERPL-MCNC: 3.2 G/DL (ref 3.4–5)
ALP LIVER SERPL-CCNC: 74 U/L
ALT SERPL-CCNC: 41 U/L
AST SERPL-CCNC: 20 U/L (ref 15–37)
BILIRUB DIRECT SERPL-MCNC: 0.1 MG/DL (ref 0–0.2)
BILIRUB SERPL-MCNC: 0.4 MG/DL (ref 0.1–2)
BUN BLD-MCNC: 19 MG/DL (ref 7–18)
CHLORIDE BLD-SCNC: 107 MMOL/L (ref 98–112)
CO2 BLD-SCNC: 24 MMOL/L (ref 21–32)
CREAT BLD-MCNC: 0.9 MG/DL
GFR SERPLBLD BASED ON 1.73 SQ M-ARVRAT: 81 ML/MIN/1.73M2 (ref 60–?)
GLUCOSE BLD-MCNC: 97 MG/DL (ref 70–99)
HCT VFR BLD AUTO: 40.5 %
HCT VFR BLD CALC: 39 %
HGB BLD-MCNC: 13.2 G/DL
ISTAT IONIZED CALCIUM FOR CHEM 8: 1.15 MMOL/L (ref 1.12–1.32)
LIPASE SERPL-CCNC: 30 U/L (ref 13–75)
LYMPHOCYTES # BLD AUTO: 0.9 X10ˆ3/UL (ref 1–4)
LYMPHOCYTES NFR BLD AUTO: 6.2 %
MCH RBC QN AUTO: 28.2 PG (ref 26–34)
MCHC RBC AUTO-ENTMCNC: 32.6 G/DL (ref 31–37)
MCV RBC AUTO: 86.5 FL (ref 80–100)
MIXED CELL %: 4.5 %
NEUTROPHILS # BLD AUTO: 12.5 X10ˆ3/UL (ref 1.5–7.7)
NEUTROPHILS NFR BLD AUTO: 89.3 %
PLATELET # BLD AUTO: 394 X10ˆ3/UL (ref 150–450)
POCT GLUCOSE URINE: NEGATIVE MG/DL
POCT KETONE URINE: NEGATIVE MG/DL
POCT LEUKOCYTE ESTERASE URINE: NEGATIVE
POCT NITRITE URINE: NEGATIVE
POCT PH URINE: 5.5 (ref 5–8)
POCT SPECIFIC GRAVITY URINE: 1.03
POCT UROBILINOGEN URINE: 0.2 MG/DL
POTASSIUM BLD-SCNC: 4.1 MMOL/L (ref 3.6–5.1)
PROT SERPL-MCNC: 7.1 G/DL (ref 6.4–8.2)
RBC # BLD AUTO: 4.68 X10ˆ6/UL
SODIUM BLD-SCNC: 141 MMOL/L (ref 136–145)
WBC # BLD AUTO: 14 X10ˆ3/UL (ref 4–11)

## 2023-03-13 PROCEDURE — 96361 HYDRATE IV INFUSION ADD-ON: CPT

## 2023-03-13 PROCEDURE — 80076 HEPATIC FUNCTION PANEL: CPT | Performed by: EMERGENCY MEDICINE

## 2023-03-13 PROCEDURE — 99215 OFFICE O/P EST HI 40 MIN: CPT

## 2023-03-13 PROCEDURE — 80047 BASIC METABLC PNL IONIZED CA: CPT

## 2023-03-13 PROCEDURE — 96374 THER/PROPH/DIAG INJ IV PUSH: CPT

## 2023-03-13 PROCEDURE — 76700 US EXAM ABDOM COMPLETE: CPT | Performed by: EMERGENCY MEDICINE

## 2023-03-13 PROCEDURE — 81002 URINALYSIS NONAUTO W/O SCOPE: CPT | Performed by: EMERGENCY MEDICINE

## 2023-03-13 PROCEDURE — 83690 ASSAY OF LIPASE: CPT | Performed by: EMERGENCY MEDICINE

## 2023-03-13 PROCEDURE — 85025 COMPLETE CBC W/AUTO DIFF WBC: CPT | Performed by: EMERGENCY MEDICINE

## 2023-03-13 RX ORDER — KETOROLAC TROMETHAMINE 30 MG/ML
30 INJECTION, SOLUTION INTRAMUSCULAR; INTRAVENOUS ONCE
Status: COMPLETED | OUTPATIENT
Start: 2023-03-13 | End: 2023-03-13

## 2023-03-13 RX ORDER — SODIUM CHLORIDE 9 MG/ML
1000 INJECTION, SOLUTION INTRAVENOUS ONCE
Status: COMPLETED | OUTPATIENT
Start: 2023-03-13 | End: 2023-03-13

## 2023-03-13 RX ORDER — ONDANSETRON 4 MG/1
4 TABLET, ORALLY DISINTEGRATING ORAL EVERY 4 HOURS PRN
Qty: 10 TABLET | Refills: 0 | Status: SHIPPED | OUTPATIENT
Start: 2023-03-13 | End: 2023-03-20

## 2023-03-13 NOTE — ED INITIAL ASSESSMENT (HPI)
Patient presents to IC with 2 weeks of right upper abdominal pain radiating to right flank intermittent but peaked last night/early am with nausea,vomiting and diarrhea. No fever. Dark urine. H/o kidney stone. Pain better now.

## 2023-03-13 NOTE — DISCHARGE INSTRUCTIONS
Tylenol or Advil as needed for pain. Take the Zofran as needed for nausea vomiting you can use over-the-counter Imodium for diarrhea clear liquids today then advance diet tomorrow. Return for worsening symptoms. Follow-up your doctor in a few days if not better.

## 2023-05-16 ENCOUNTER — OFFICE VISIT (OUTPATIENT)
Dept: INTERNAL MEDICINE CLINIC | Facility: CLINIC | Age: 43
End: 2023-05-16
Payer: COMMERCIAL

## 2023-05-16 VITALS
SYSTOLIC BLOOD PRESSURE: 130 MMHG | OXYGEN SATURATION: 98 % | HEIGHT: 62 IN | BODY MASS INDEX: 38.9 KG/M2 | HEART RATE: 91 BPM | TEMPERATURE: 99 F | RESPIRATION RATE: 16 BRPM | DIASTOLIC BLOOD PRESSURE: 80 MMHG | WEIGHT: 211.38 LBS

## 2023-05-16 DIAGNOSIS — E03.9 HYPOTHYROIDISM, UNSPECIFIED TYPE: Chronic | ICD-10-CM

## 2023-05-16 DIAGNOSIS — F41.1 GENERALIZED ANXIETY DISORDER: Chronic | ICD-10-CM

## 2023-05-16 DIAGNOSIS — F33.1 MAJOR DEPRESSIVE DISORDER, RECURRENT EPISODE, MODERATE (HCC): Chronic | ICD-10-CM

## 2023-05-16 DIAGNOSIS — G43.109 MIGRAINE WITH AURA AND WITHOUT STATUS MIGRAINOSUS, NOT INTRACTABLE: Primary | Chronic | ICD-10-CM

## 2023-05-16 DIAGNOSIS — R60.0 BILATERAL LOWER EXTREMITY EDEMA: ICD-10-CM

## 2023-05-16 DIAGNOSIS — R68.2 DRY MOUTH: ICD-10-CM

## 2023-05-16 DIAGNOSIS — E78.2 MIXED HYPERLIPIDEMIA: Chronic | ICD-10-CM

## 2023-05-16 PROBLEM — G43.009 MIGRAINE WITHOUT AURA AND RESPONSIVE TO TREATMENT: Status: ACTIVE | Noted: 2023-05-16

## 2023-05-16 PROCEDURE — 3008F BODY MASS INDEX DOCD: CPT | Performed by: INTERNAL MEDICINE

## 2023-05-16 PROCEDURE — 99214 OFFICE O/P EST MOD 30 MIN: CPT | Performed by: INTERNAL MEDICINE

## 2023-05-16 PROCEDURE — 3075F SYST BP GE 130 - 139MM HG: CPT | Performed by: INTERNAL MEDICINE

## 2023-05-16 PROCEDURE — 3079F DIAST BP 80-89 MM HG: CPT | Performed by: INTERNAL MEDICINE

## 2023-05-16 RX ORDER — NARATRIPTAN 2.5 MG/1
2.5 TABLET ORAL AS NEEDED
Qty: 30 TABLET | Refills: 1 | Status: SHIPPED | OUTPATIENT
Start: 2023-05-16

## 2023-05-16 RX ORDER — FAMOTIDINE 20 MG
1 TABLET ORAL AS DIRECTED
COMMUNITY

## 2023-05-16 RX ORDER — IBUPROFEN 600 MG
TABLET ORAL
COMMUNITY

## 2023-05-16 NOTE — PATIENT INSTRUCTIONS
- Get thyroid blood test done. You do not have to fast for this blood test.  We will see if we need to change your thyroid medication based on blood test results. - Schedule echocardiogram (heart ultrasound) to look into leg swelling. Call central scheduling at [phone] to schedule. If echocardiogram is normal we may start you on a diuretic medication to help with blood pressure and fluid retention.  - Follow up with our ENT clinic for your dry mouth (also bring up to Rheumatologist Dr. Adilene Whipple when you see him in July):  Dr. Nabil Lorenz, and [address] [place] [address] Road [address] [place] [address]. [place], [address]  [phone]  - We will try a different migraine medication, naratriptan. Take as needed for headache. Do not take more than 2 tablets in 1 day. - Call Dr. Rios [place] office to discuss your up and down mood. If your symptoms get worse, go to [facility] or call 911. It was a pleasure seeing you in the clinic today. Thank you for choosing the [facility] office for your healthcare needs. Please call at [phone] with any questions or concerns.     Rosalba Whipple MD

## 2023-06-07 ENCOUNTER — TELEPHONE (OUTPATIENT)
Dept: INTERNAL MEDICINE CLINIC | Facility: CLINIC | Age: 43
End: 2023-06-07

## 2023-06-07 DIAGNOSIS — M25.50 CHRONIC PAIN OF MULTIPLE JOINTS: ICD-10-CM

## 2023-06-07 DIAGNOSIS — M79.7 FIBROMYALGIA: Primary | ICD-10-CM

## 2023-06-07 DIAGNOSIS — G89.29 CHRONIC PAIN OF MULTIPLE JOINTS: ICD-10-CM

## 2023-06-07 NOTE — TELEPHONE ENCOUNTER
[facility] will not cover physical therapy/occupational therapy for a private clinic such as that one. They are not in network. As far as I know [facility] are only ones covered/in network. I can refer her to [facility].  She can go to [place] to find closest clinic    [email or web address]

## 2023-06-07 NOTE — TELEPHONE ENCOUNTER
Patient called requesting a referral for Occupational Therapist Rebeca Lozoya for Fibromyalgia. .. Patient stated she's in constant pain.

## 2023-06-12 ENCOUNTER — APPOINTMENT (OUTPATIENT)
Dept: GENERAL RADIOLOGY | Age: 43
End: 2023-06-12
Attending: EMERGENCY MEDICINE
Payer: COMMERCIAL

## 2023-06-12 ENCOUNTER — HOSPITAL ENCOUNTER (OUTPATIENT)
Age: 43
Discharge: HOME | End: 2023-06-12
Payer: COMMERCIAL

## 2023-06-12 ENCOUNTER — TELEPHONE (OUTPATIENT)
Facility: CLINIC | Age: 43
End: 2023-06-12

## 2023-06-12 ENCOUNTER — TELEPHONE (OUTPATIENT)
Dept: INTERNAL MEDICINE CLINIC | Facility: CLINIC | Age: 43
End: 2023-06-12

## 2023-06-12 ENCOUNTER — HOSPITAL ENCOUNTER (OUTPATIENT)
Age: 43
Discharge: HOME | End: 2023-06-12
Attending: EMERGENCY MEDICINE
Payer: COMMERCIAL

## 2023-06-12 VITALS
DIASTOLIC BLOOD PRESSURE: 70 MMHG | TEMPERATURE: 98 F | HEIGHT: 62 IN | HEART RATE: 78 BPM | BODY MASS INDEX: 38.83 KG/M2 | WEIGHT: 211 LBS | OXYGEN SATURATION: 100 % | RESPIRATION RATE: 16 BRPM | SYSTOLIC BLOOD PRESSURE: 138 MMHG

## 2023-06-12 DIAGNOSIS — R07.89 CHEST PAIN, MUSCULOSKELETAL: Primary | ICD-10-CM

## 2023-06-12 LAB
BUN BLD-MCNC: 12 MG/DL (ref 7–18)
CHLORIDE BLD-SCNC: 104 MMOL/L (ref 98–112)
CO2 BLD-SCNC: 24 MMOL/L (ref 21–32)
CREAT BLD-MCNC: 0.7 MG/DL
DDIMER WHOLE BLOOD: <200 NG/ML DDU (ref ?–400)
GFR SERPLBLD BASED ON 1.73 SQ M-ARVRAT: 110 ML/MIN/1.73M2 (ref 60–?)
GLUCOSE BLD-MCNC: 91 MG/DL (ref 70–99)
HCT VFR BLD CALC: 36 %
ISTAT IONIZED CALCIUM FOR CHEM 8: 1.21 MMOL/L (ref 1.12–1.32)
POTASSIUM BLD-SCNC: 3.8 MMOL/L (ref 3.6–5.1)
SODIUM BLD-SCNC: 140 MMOL/L (ref 136–145)
TROPONIN I BLD-MCNC: <0.02 NG/ML

## 2023-06-12 PROCEDURE — 84484 ASSAY OF TROPONIN QUANT: CPT

## 2023-06-12 PROCEDURE — 71046 X-RAY EXAM CHEST 2 VIEWS: CPT | Performed by: EMERGENCY MEDICINE

## 2023-06-12 PROCEDURE — 85378 FIBRIN DEGRADE SEMIQUANT: CPT | Performed by: EMERGENCY MEDICINE

## 2023-06-12 PROCEDURE — 93005 ELECTROCARDIOGRAM TRACING: CPT

## 2023-06-12 PROCEDURE — 72050 X-RAY EXAM NECK SPINE 4/5VWS: CPT | Performed by: EMERGENCY MEDICINE

## 2023-06-12 PROCEDURE — 80047 BASIC METABLC PNL IONIZED CA: CPT

## 2023-06-12 RX ORDER — KETOROLAC TROMETHAMINE 30 MG/ML
30 INJECTION, SOLUTION INTRAMUSCULAR; INTRAVENOUS ONCE
Status: COMPLETED | OUTPATIENT
Start: 2023-06-12 | End: 2023-06-12

## 2023-06-12 NOTE — ED INITIAL ASSESSMENT (HPI)
Pt here c/o lt sided chest pain radiating to lt arm. Intermittent for last 10 days. Bilateral ankle swelling. Denies fever, cough.

## 2023-06-12 NOTE — TELEPHONE ENCOUNTER
She may need a lower dose of the thyroid medication, but she can stop the levothyroxine for now. She should get repeat TSH with reflex blood test done (order in system from March).

## 2023-06-12 NOTE — TELEPHONE ENCOUNTER
Patient wants to know if okay to stop  Thyroid medicine. Spoke to patient about  An hour ago patient states it felt like someone stepped on her heart \"squish pain\". Becoming more often, hearts starts to race after pain. Feels  Like its pounding. Left arm pain from shoulder down, fingers numb when pain occurs. Feels like heaviness/pressure. Feels nausea. Denies headache. Denies jaw pain right Now but sometimes she says she feels like it's drooping and it will get stuck. Advised patient to proceed to ER for evaluation given her symptoms. She states she feels fine now. Patient was still advised to be evaluated at IC/ER. Discussed common s/s of heart attack. 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

## 2023-06-12 NOTE — TELEPHONE ENCOUNTER
Pt called stating since March she has been experiencing CP and a fast heart rate. She states these sxs began once she started taking Levothyroxine in March. She states the sxs became more frequent last week. Pt would like to know if she needs to stop taking the medication.

## 2023-06-12 NOTE — TELEPHONE ENCOUNTER
Spoke with patient. She has not been seen since 12/6/21. Norethindrone has been refilled due to patient scheduling appointments then cancelling before. Explained to patient no recommendations can be made without being seen. If her specialist recommends coming off norethindrone she has to weigh the risks and benefits and use a secondary form of birth control. Monitor bleeding. Understanding verbalized.

## 2023-06-12 NOTE — DISCHARGE INSTRUCTIONS
Use Advil or Aleve as needed follow-up with your doctor over next 2 days apply heating pad return if worse.

## 2023-06-13 LAB
ATRIAL RATE: 87 BPM
P AXIS: 33 DEGREES
P-R INTERVAL: 138 MS
Q-T INTERVAL: 368 MS
QRS DURATION: 92 MS
QTC CALCULATION (BEZET): 442 MS
R AXIS: 42 DEGREES
T AXIS: 40 DEGREES
VENTRICULAR RATE: 87 BPM

## 2023-06-14 PROBLEM — F31.32 BIPOLAR 1 DISORDER, DEPRESSED, MODERATE (HCC): Status: ACTIVE | Noted: 2023-06-14

## 2023-06-14 PROBLEM — F33.1 MAJOR DEPRESSIVE DISORDER, RECURRENT EPISODE, MODERATE (HCC): Chronic | Status: RESOLVED | Noted: 2021-10-06 | Resolved: 2023-06-14

## 2023-06-15 ENCOUNTER — TELEPHONE (OUTPATIENT)
Dept: INTERNAL MEDICINE CLINIC | Facility: CLINIC | Age: 43
End: 2023-06-15

## 2023-06-15 NOTE — TELEPHONE ENCOUNTER
Patient called requesting an order for Thyroids and Blood Pressure to be placed. Patient was seen in yesterday in Urgent Care, She stated someone informed her there was no order in the system.

## 2023-06-15 NOTE — TELEPHONE ENCOUNTER
Future Appointments   Date Time Provider Lennie Noonan   6/19/2023 12:30 PM CARIDAD Rankin [facility] OB [facility] Jasson   6/27/2023  5:00 PM Kierra Steele MD [facility] 8 [facility] [place]   7/1/2023 12:45 PM [address] [facility] CARD STRESS ECHO RM 1 ECARD ECHO [facility] AT [facility]   7/11/2023  2:30 PM Lake Jimenez MD [facility] [place]   7/26/2023  2:00 PM Dirk gerardo DO [address] [place] [address]   7/27/2023  4:15 PM Jenn Cano MD [facility]

## 2023-06-15 NOTE — TELEPHONE ENCOUNTER
Called pt back. Pt stated she was in UC for chest pain on 6/12. She stated she wanted her thyroid levels checked because she's had some intermittent high BP readings recently. UC provider told her it could be related to her thyroid. I advised pt to set up a HFU appt with RP first, since that is what discharge report states. I stated RP may want to order more labs at F/u appt. RP: please advise on any labs prior to HFU appt or wait till you examine pt?

## 2023-06-15 NOTE — TELEPHONE ENCOUNTER
See telephone note from 6/12 - TSH/thyroid blood test order is already in the system from before. Should get that done.   Does not have to fast

## 2023-06-16 RX ORDER — FAMOTIDINE 20 MG
1 TABLET ORAL DAILY
Qty: 90 CAPSULE | Refills: 0 | Status: SHIPPED | OUTPATIENT
Start: 2023-06-16

## 2023-06-19 ENCOUNTER — TELEPHONE (OUTPATIENT)
Dept: OBGYN CLINIC | Facility: CLINIC | Age: 43
End: 2023-06-19

## 2023-06-19 ENCOUNTER — OFFICE VISIT (OUTPATIENT)
Dept: OBGYN CLINIC | Facility: CLINIC | Age: 43
End: 2023-06-19
Payer: COMMERCIAL

## 2023-06-19 ENCOUNTER — LAB ENCOUNTER (OUTPATIENT)
Dept: LAB | Age: 43
End: 2023-06-19
Attending: INTERNAL MEDICINE
Payer: COMMERCIAL

## 2023-06-19 VITALS
WEIGHT: 213.88 LBS | BODY MASS INDEX: 39.36 KG/M2 | HEART RATE: 99 BPM | DIASTOLIC BLOOD PRESSURE: 83 MMHG | HEIGHT: 62 IN | SYSTOLIC BLOOD PRESSURE: 128 MMHG

## 2023-06-19 DIAGNOSIS — Z12.31 BREAST CANCER SCREENING BY MAMMOGRAM: ICD-10-CM

## 2023-06-19 DIAGNOSIS — E03.9 HYPOTHYROIDISM, UNSPECIFIED TYPE: ICD-10-CM

## 2023-06-19 DIAGNOSIS — Z01.419 ENCOUNTER FOR WELL WOMAN EXAM WITH ROUTINE GYNECOLOGICAL EXAM: Primary | ICD-10-CM

## 2023-06-19 LAB — TSI SER-ACNC: 2.09 MIU/ML (ref 0.36–3.74)

## 2023-06-19 PROCEDURE — 84443 ASSAY THYROID STIM HORMONE: CPT

## 2023-06-19 RX ORDER — DESOGESTREL AND ETHINYL ESTRADIOL 21-5 (28)
1 KIT ORAL DAILY
Qty: 28 TABLET | Refills: 3 | Status: SHIPPED | OUTPATIENT
Start: 2023-06-19 | End: 2023-06-19

## 2023-06-29 DIAGNOSIS — E03.9 HYPOTHYROIDISM, UNSPECIFIED TYPE: ICD-10-CM

## 2023-06-29 RX ORDER — LEVOTHYROXINE SODIUM 0.05 MG/1
50 TABLET ORAL
Qty: 90 TABLET | Refills: 0 | Status: SHIPPED | OUTPATIENT
Start: 2023-06-29

## 2023-07-11 ENCOUNTER — OFFICE VISIT (OUTPATIENT)
Dept: RHEUMATOLOGY | Facility: CLINIC | Age: 43
End: 2023-07-11
Payer: COMMERCIAL

## 2023-07-11 VITALS
HEIGHT: 62 IN | DIASTOLIC BLOOD PRESSURE: 80 MMHG | RESPIRATION RATE: 16 BRPM | TEMPERATURE: 98 F | OXYGEN SATURATION: 99 % | HEART RATE: 88 BPM | SYSTOLIC BLOOD PRESSURE: 128 MMHG | BODY MASS INDEX: 39.53 KG/M2 | WEIGHT: 214.81 LBS

## 2023-07-11 DIAGNOSIS — M70.62 GREATER TROCHANTERIC BURSITIS OF BOTH HIPS: ICD-10-CM

## 2023-07-11 DIAGNOSIS — Z51.81 THERAPEUTIC DRUG MONITORING: ICD-10-CM

## 2023-07-11 DIAGNOSIS — M17.0 BILATERAL PRIMARY OSTEOARTHRITIS OF KNEE: ICD-10-CM

## 2023-07-11 DIAGNOSIS — M79.7 FIBROMYALGIA: Primary | ICD-10-CM

## 2023-07-11 DIAGNOSIS — M79.18 MYOFASCIAL PAIN SYNDROME: ICD-10-CM

## 2023-07-11 DIAGNOSIS — M70.61 GREATER TROCHANTERIC BURSITIS OF BOTH HIPS: ICD-10-CM

## 2023-07-11 DIAGNOSIS — M54.89 INFLAMMATORY BACK PAIN: ICD-10-CM

## 2023-07-11 PROCEDURE — 99204 OFFICE O/P NEW MOD 45 MIN: CPT | Performed by: INTERNAL MEDICINE

## 2023-07-11 PROCEDURE — 3079F DIAST BP 80-89 MM HG: CPT | Performed by: INTERNAL MEDICINE

## 2023-07-11 PROCEDURE — 3008F BODY MASS INDEX DOCD: CPT | Performed by: INTERNAL MEDICINE

## 2023-07-11 PROCEDURE — 3074F SYST BP LT 130 MM HG: CPT | Performed by: INTERNAL MEDICINE

## 2023-07-11 RX ORDER — MELOXICAM 15 MG/1
15 TABLET ORAL
Qty: 30 TABLET | Refills: 2 | Status: SHIPPED | OUTPATIENT
Start: 2023-07-11

## 2023-07-11 RX ORDER — OLANZAPINE 5 MG/1
5 TABLET ORAL NIGHTLY
COMMUNITY

## 2023-07-11 NOTE — PATIENT INSTRUCTIONS
For carpal tunnel syndrome, use right wrist splints to be worn nightly. Take meloxicam 15 mg daily with food (breakfast), don't take ibuprofen, naproxen, voltaren the same day, they are in the same family.     Do physical therapy     X-rays and blood work after your upcoming primary doctor's appointment

## 2023-07-14 ENCOUNTER — TELEMEDICINE (OUTPATIENT)
Dept: INTERNAL MEDICINE CLINIC | Facility: CLINIC | Age: 43
End: 2023-07-14
Payer: COMMERCIAL

## 2023-07-14 DIAGNOSIS — R53.83 FATIGUE, UNSPECIFIED TYPE: ICD-10-CM

## 2023-07-14 DIAGNOSIS — E03.9 HYPOTHYROIDISM, UNSPECIFIED TYPE: Primary | Chronic | ICD-10-CM

## 2023-07-14 DIAGNOSIS — R06.83 SNORING: ICD-10-CM

## 2023-07-14 DIAGNOSIS — R40.0 DAYTIME SOMNOLENCE: ICD-10-CM

## 2023-07-14 PROCEDURE — 99214 OFFICE O/P EST MOD 30 MIN: CPT | Performed by: INTERNAL MEDICINE

## 2023-07-14 NOTE — PROGRESS NOTES
Prerna Farias is a 36year old female here today for a telemedicine/video visit. Patient is in the state of [place] during this visit. Prerna Farias verbally consents to a Video visit service on 07/14/23. Patient understands and accepts financial responsibility for any deductible, co-insurance and/or co-pays associated with this service. Duration of the service: 15 minutes  Start time: 1:15 PM  End time: 1:30 PM    HPI:  Patient presents for follow up on several issues. Hypothyroidism - patient's TSH improved. However she is having a host of symptoms - increased tiredness, fatigue, cannot lose weight. She notes that she is sleeping all day/throughout the day. Snoring more of late as well. Past medical, surgical, family, and social histories were reviewed and are unchanged from previous visit. ROS:  GENERAL: fatigue; denies fevers/chills/sweats  SKIN: denies any unusual skin lesions  EYES: denies blurred vision or double vision  HEENT: denies nasal congestion, sinus pain or sinus tenderness  LUNGS: denies shortness of breath with exertion  CARDIOVASCULAR: denies chest pain on exertion  GI: denies abdominal pain, denies heartburn, denies diarrhea  MUSCULOSKELETAL: body aches, muscle aches  NEURO: denies headaches    EXAM:  GENERAL: Alert and oriented, well developed, well nourished,in no apparent distress  SKIN: no rashes,no suspicious lesions of face  HEENT: atraumatic, EOMI, normal lid and conjunctiva  NECK: no grossly visible jvd or thyromegaly  LUNGS: speaking in full sentences, no increased work of breathing, no audible wheezing  NEURO: alert and oriented x 3  PSYCH: pleasant, appropriate mood and affect    ASSESSMENT/PLAN:  1. Hypothyroidism, unspecified type  2. Fatigue, unspecified type  3. Daytime somnolence  4. Snoring  Patient's TSH improved with levothyroxine however paradoxically she is having more worsening/hypothyroid symptoms - fatigue, weight gain, tiredness.   States she feels worse after starting levothyroxine. Some daytime somnolence as well. She had a normal sleep study in the past but it was 8-9 years ago. Will have her hold levothyroxine for two weeks. Repeat TSH in 3-4 weeks. Will check sleep study. Will have patient follow up with Endocrinology as well. - ENDOCRINOLOGY - INTERNAL  - TSH+FREE T4; Future  - GENERAL SLEEP STUDY TRANSCRIPTION; Future  - OP REFERRAL TO DIAGNOSTIC SLEEP STUDY    Return to clinic in 6 months for follow up on chronic issues, or earlier as needed for acute issues. Please note that the following visit was completed using two-way, real-time interactive audio and video communication. This has been done in good padmaja to provide continuity of care in the best interest of the provider-patient relationship. There are limitations of this visit as a complete head to toe physical exam could not be performed. Every conscious effort was taken to allow for sufficient and adequate time. This billing was spent on reviewing labs, medications, radiology tests and decision making. Appropriate medical decision-making and tests are ordered as detailed in the plan of care above.

## 2023-08-05 ENCOUNTER — HOSPITAL ENCOUNTER (OUTPATIENT)
Dept: CV DIAGNOSTICS | Facility: HOSPITAL | Age: 43
Discharge: HOME | End: 2023-08-05
Attending: INTERNAL MEDICINE
Payer: COMMERCIAL

## 2023-08-05 DIAGNOSIS — R60.0 BILATERAL LOWER EXTREMITY EDEMA: ICD-10-CM

## 2023-08-05 PROCEDURE — 93306 TTE W/DOPPLER COMPLETE: CPT | Performed by: INTERNAL MEDICINE

## 2023-08-11 ENCOUNTER — OFFICE VISIT (OUTPATIENT)
Dept: SLEEP CENTER | Age: 43
End: 2023-08-11
Attending: Other
Payer: COMMERCIAL

## 2023-08-11 DIAGNOSIS — R06.83 SNORING: ICD-10-CM

## 2023-08-11 DIAGNOSIS — R40.0 DAYTIME SOMNOLENCE: ICD-10-CM

## 2023-08-11 DIAGNOSIS — R53.83 FATIGUE, UNSPECIFIED TYPE: ICD-10-CM

## 2023-08-11 PROCEDURE — 95810 POLYSOM 6/> YRS 4/> PARAM: CPT

## 2023-08-31 ENCOUNTER — SLEEP STUDY (OUTPATIENT)
Facility: CLINIC | Age: 43
End: 2023-08-31
Payer: COMMERCIAL

## 2023-08-31 DIAGNOSIS — G47.33 OBSTRUCTIVE SLEEP APNEA SYNDROME: Primary | ICD-10-CM

## 2023-08-31 PROCEDURE — 95810 POLYSOM 6/> YRS 4/> PARAM: CPT | Performed by: OTHER

## 2023-09-15 ENCOUNTER — TELEPHONE (OUTPATIENT)
Facility: CLINIC | Age: 43
End: 2023-09-15

## 2023-09-15 DIAGNOSIS — G47.33 OBSTRUCTIVE SLEEP APNEA: Primary | ICD-10-CM

## 2023-10-09 ENCOUNTER — OFFICE VISIT (OUTPATIENT)
Facility: CLINIC | Age: 43
End: 2023-10-09
Payer: COMMERCIAL

## 2023-10-09 VITALS
RESPIRATION RATE: 18 BRPM | BODY MASS INDEX: 40.12 KG/M2 | HEART RATE: 90 BPM | SYSTOLIC BLOOD PRESSURE: 106 MMHG | OXYGEN SATURATION: 98 % | TEMPERATURE: 97 F | HEIGHT: 62 IN | WEIGHT: 218 LBS | DIASTOLIC BLOOD PRESSURE: 64 MMHG

## 2023-10-09 DIAGNOSIS — F31.32 BIPOLAR 1 DISORDER, DEPRESSED, MODERATE (HCC): ICD-10-CM

## 2023-10-09 DIAGNOSIS — G47.19 DAYTIME HYPERSOMNOLENCE: ICD-10-CM

## 2023-10-09 DIAGNOSIS — G47.33 OSA (OBSTRUCTIVE SLEEP APNEA): Primary | ICD-10-CM

## 2023-10-09 NOTE — PROGRESS NOTES
This is a 36year old female who presents with the following symptoms, risk factors, behaviors or other items associated with sleep problems. Sleep Apnea:   snoring; stops breathing; restless sleep  Insomnia:  restless sleep  Restless Leg:  urge to move legs when trying to sleep; moving leges helps relieve discomfort  Parasomnias:   sleep walking; very restless sleep  Daytime Problems:  sleepiness; fatigue; irritable/parikh; dificulty concentrating; memory problems    The patient's Cherry Sleepiness score is 11/24.

## 2023-10-23 ENCOUNTER — TELEPHONE (OUTPATIENT)
Facility: CLINIC | Age: 43
End: 2023-10-23

## 2023-10-23 RX ORDER — DESOGESTREL AND ETHINYL ESTRADIOL AND ETHINYL ESTRADIOL 21-5 (28)
1 KIT ORAL DAILY
Qty: 84 TABLET | Refills: 1 | Status: SHIPPED | OUTPATIENT
Start: 2023-10-23

## 2023-10-23 RX ORDER — DESOGESTREL AND ETHINYL ESTRADIOL AND ETHINYL ESTRADIOL 21-5 (28)
1 KIT ORAL DAILY
Qty: 28 TABLET | Refills: 0 | OUTPATIENT
Start: 2023-10-23

## 2023-10-23 NOTE — TELEPHONE ENCOUNTER
Spoke with pt. She would like a refill on her Viorele BCP. She was seen 6/2023. Refill sent. Verbalized understanding.

## 2023-11-07 ENCOUNTER — TELEPHONE (OUTPATIENT)
Dept: INTERNAL MEDICINE CLINIC | Facility: CLINIC | Age: 43
End: 2023-11-07

## 2023-11-07 RX ORDER — AMOXICILLIN AND CLAVULANATE POTASSIUM 875; 125 MG/1; MG/1
1 TABLET, FILM COATED ORAL 2 TIMES DAILY
Qty: 14 TABLET | Refills: 0 | Status: SHIPPED | OUTPATIENT
Start: 2023-11-07 | End: 2023-11-14

## 2023-11-07 NOTE — TELEPHONE ENCOUNTER
Patient calling because she has been having a cough for over 2 months that worsened as of last night. She did a covid test this morning, it was negative. Patient states both of her children have strep.

## 2023-11-07 NOTE — TELEPHONE ENCOUNTER
Patient c/o productive cough with green sputum, sore throat, sx of laryngitis, sore throat, sinus pressure, nasal congestion and body aches x 1 month. Intially was getting better but then sx restarted 2 weeks ago. Negative covid test today. Her children recently treated for strep with amoxicillin and cough syrup, states they have much improved. Patient denies CP, SOB, fever. Has tried OTC mucinex, robitussin, dayquil/nyquil w/o help. Please advise would you like to add for VV, have patient proceed to [facility], rx abx?      Allergies: seasonal  Pharm: [facility] # [id number]

## 2023-11-16 ENCOUNTER — TELEMEDICINE (OUTPATIENT)
Dept: INTERNAL MEDICINE CLINIC | Facility: CLINIC | Age: 43
End: 2023-11-16
Payer: COMMERCIAL

## 2023-11-16 ENCOUNTER — TELEPHONE (OUTPATIENT)
Dept: INTERNAL MEDICINE CLINIC | Facility: CLINIC | Age: 43
End: 2023-11-16

## 2023-11-16 DIAGNOSIS — J22 LOWER RESPIRATORY INFECTION: Primary | ICD-10-CM

## 2023-11-16 PROCEDURE — 99213 OFFICE O/P EST LOW 20 MIN: CPT | Performed by: INTERNAL MEDICINE

## 2023-11-16 RX ORDER — AZELASTINE 1 MG/ML
1 SPRAY, METERED NASAL 2 TIMES DAILY
Qty: 30 ML | Refills: 0 | Status: SHIPPED | OUTPATIENT
Start: 2023-11-16

## 2023-11-16 RX ORDER — METHYLPREDNISOLONE 4 MG/1
TABLET ORAL
Qty: 21 EACH | Refills: 0 | Status: SHIPPED | OUTPATIENT
Start: 2023-11-16

## 2023-11-16 RX ORDER — CODEINE PHOSPHATE AND GUAIFENESIN 10; 100 MG/5ML; MG/5ML
5 SOLUTION ORAL NIGHTLY PRN
Qty: 118 ML | Refills: 0 | Status: SHIPPED | OUTPATIENT
Start: 2023-11-16

## 2023-11-16 NOTE — TELEPHONE ENCOUNTER
Patient c/o worsening productive cough with green mucus and now has began to hear some wheezing. Feels sore in chest/back from coughing. Some shortness of breath when coughing. Using albuterol inh. She did complete antibiotics on 11/14, did not really notice any improvement in symptoms. Has also been alternating OTC Nyquil, Mucinex, Robitussin. Noticed some bloody mucus from nose today. Also with occasional sweats/chills daily but denies fever. 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 Ok to add today at 1:30?

## 2023-11-16 NOTE — PROGRESS NOTES
Young Jane is a 39year old female here today for a telemedicine/video visit. Patient is in the state of [place] during this visit. Young Jane verbally consents to a Video visit service on 11/16/23. Patient understands and accepts financial responsibility for any deductible, co-insurance and/or co-pays associated with this service. Duration of the service: 5 minutes  Start time: 1:30 PM  End time: 1:35 PM    HPI:  Patient presents for follow up on cough/URI symptoms. Going on for several weeks - 6-8 weeks. Initially felt better for a week or two but then symptoms came back. Prescribed Augmentin 10 days ago. Still with productive cough, now with more chest congestion. Back pain from coughing. She gets some shortness of breath after having coughing fits. No sinus/pain pressure. A lot of nasal congestion/post-nasal drip. Has been taking OTC nasal spray, Mucinex, Robitussin, Nyquil without improvement of symptoms. Past medical, surgical, family, and social histories were reviewed and are unchanged from previous visit. ROS:  GENERAL: denies fevers/chills/sweats  SKIN: denies any unusual skin lesions  EYES: denies blurred vision or double vision  HEENT: nasal congestion/post-nasal drip; denies sinus pain/pressure  LUNGS: cough, chest congestion  CARDIOVASCULAR: denies chest pain on exertion  GI: denies abdominal pain, denies heartburn, denies diarrhea  MUSCULOSKELETAL: denies acute back pain, denies body aches  NEURO: denies acute headaches    EXAM:  GENERAL: Alert and oriented, well developed, well nourished,in no apparent distress  SKIN: no rashes,no suspicious lesions of face  HEENT: atraumatic, EOMI, normal lid and conjunctiva  NECK: no grossly visible jvd or thyromegaly  LUNGS: speaking in full sentences, no increased work of breathing, no audible wheezing  NEURO: alert and oriented x 3  PSYCH: pleasant, appropriate mood and affect    ASSESSMENT/PLAN:  1.  Lower respiratory infection  Patient with persistent cough, chest congestion, nasal drainage for several weeks. Finished course of Augmentin. Taking multiple OTC therapies. Will start on Medrol dosepak, azelastine nasal spray, guaifenesin-codeine syrup. Advised patient to take codeine syrup at night only due to potential drowsiness. Advised not to drive after taking codeine syrup. May consider different antibiotic and/or chest x-ray if symptoms persist.  - methylPREDNISolone (MEDROL) 4 MG Oral Tablet Therapy Pack; As directed. Dispense: 21 each; Refill: 0  - guaiFENesin-codeine 100-10 MG/5ML Oral Solution; Take 5 mL by mouth nightly as needed for cough or congestion. Dispense: 118 mL; Refill: 0  - azelastine 0.1 % Nasal Solution; 1 spray by Nasal route 2 (two) times daily. Dispense: 30 mL; Refill: 0    Return to clinic in 3-6 months for follow up on chronic issues, or earlier as needed for acute issues. Please note that the following visit was completed using two-way, real-time interactive audio and video communication. This has been done in good padmaja to provide continuity of care in the best interest of the provider-patient relationship. There are limitations of this visit as a complete head to toe physical exam could not be performed. Every conscious effort was taken to allow for sufficient and adequate time. This billing was spent on reviewing labs, medications, radiology tests and decision making. Appropriate medical decision-making and tests are ordered as detailed in the plan of care above.

## 2023-11-16 NOTE — TELEPHONE ENCOUNTER
Patient called in stating that she has a cough and is very congested for two months now. Patient states that the medication that RP prescribed doesn't seem to be helping. Patient would like a cb with recommendations.     Future Appointments   Date Time Provider Lennie Sidhui   12/11/2023  4:30 PM Ivon Roach MD [facility] 8 [facility] [place]

## 2023-11-19 ENCOUNTER — OFFICE VISIT (OUTPATIENT)
Dept: SLEEP CENTER | Age: 43
End: 2023-11-19
Attending: INTERNAL MEDICINE
Payer: COMMERCIAL

## 2023-11-19 DIAGNOSIS — G47.33 OSA (OBSTRUCTIVE SLEEP APNEA): ICD-10-CM

## 2023-11-19 DIAGNOSIS — G47.19 DAYTIME HYPERSOMNOLENCE: ICD-10-CM

## 2023-11-19 PROCEDURE — 95811 POLYSOM 6/>YRS CPAP 4/> PARM: CPT

## 2023-11-29 ENCOUNTER — SLEEP STUDY (OUTPATIENT)
Facility: CLINIC | Age: 43
End: 2023-11-29
Payer: COMMERCIAL

## 2023-11-29 DIAGNOSIS — G47.33 OBSTRUCTIVE SLEEP APNEA: Primary | ICD-10-CM

## 2023-11-29 PROCEDURE — 95811 POLYSOM 6/>YRS CPAP 4/> PARM: CPT | Performed by: INTERNAL MEDICINE

## 2023-12-11 ENCOUNTER — TELEPHONE (OUTPATIENT)
Facility: CLINIC | Age: 43
End: 2023-12-11

## 2023-12-11 DIAGNOSIS — G47.33 OSA (OBSTRUCTIVE SLEEP APNEA): Primary | ICD-10-CM

## 2023-12-11 NOTE — TELEPHONE ENCOUNTER
The patient should be prescribed CPAP at 10 cm H2O, with humidity at 4 and EPR on.   2. The patient was fitted with a "[employer] 2 mask, size Medium. 3. The patient should avoid alcohol and sedative medications, as these may worsen severity of symptoms. 4. The patient should avoid drowsy driving. 5. Patient may follow up with a sleep specialist in clinic.

## 2023-12-19 PROBLEM — F51.05 INSOMNIA RELATED TO ANOTHER MENTAL DISORDER: Status: ACTIVE | Noted: 2023-12-19

## 2024-01-11 ENCOUNTER — TELEPHONE (OUTPATIENT)
Facility: CLINIC | Age: 44
End: 2024-01-11

## 2024-01-11 NOTE — TELEPHONE ENCOUNTER
Spoke with pt. I let her know I reviewed Anyi's last office visit notes and there is nothing documented on following up in 6 months. Will check with Anyi and call her back.    Also was prescribed Norethindrone 2.5 mg at bedtime in 2022. Was switched to Viorele 0.12-0.02/0.01 mg in 2023. Getting periods & cramping with Viorele. She would like to go back to her previous BCP. Aware I will route to Anyi and call with recommendations. Verbalized understanding.

## 2024-01-11 NOTE — TELEPHONE ENCOUNTER
Pt called to schedule WWE, when told she is not due until June 2024 pt stated that she is supposed to come every 6 months. I checked notes from last WWE and did not see that she was due to come back that soon, pt requested a nurse check and call her back to discuss what to do moving forward.

## 2024-07-08 ENCOUNTER — TELEPHONE (OUTPATIENT)
Facility: CLINIC | Age: 44
End: 2024-07-08

## 2024-07-08 RX ORDER — NORETHINDRONE ACETATE 5 MG
2.5 TABLET ORAL DAILY
Qty: 45 TABLET | Refills: 1 | OUTPATIENT
Start: 2024-07-08

## 2024-07-08 NOTE — TELEPHONE ENCOUNTER
Patient aware refill has been sent to the [facility]'s on [address]. Verbalized understanding.

## 2024-07-08 NOTE — TELEPHONE ENCOUNTER
Patient called to get a refill of BC sent to her pharmacy. Patient scheduled well woman exam for 7/11 with Anyi MCLEAN but needs a refill before then.

## 2024-07-11 ENCOUNTER — OFFICE VISIT (OUTPATIENT)
Facility: CLINIC | Age: 44
End: 2024-07-11
Payer: COMMERCIAL

## 2024-07-11 VITALS
DIASTOLIC BLOOD PRESSURE: 76 MMHG | SYSTOLIC BLOOD PRESSURE: 134 MMHG | HEART RATE: 98 BPM | HEIGHT: 62 IN | WEIGHT: 221.63 LBS | BODY MASS INDEX: 40.78 KG/M2

## 2024-07-11 DIAGNOSIS — Z12.4 CERVICAL CANCER SCREENING: ICD-10-CM

## 2024-07-11 DIAGNOSIS — Z01.419 ENCOUNTER FOR WELL WOMAN EXAM WITH ROUTINE GYNECOLOGICAL EXAM: Primary | ICD-10-CM

## 2024-07-11 DIAGNOSIS — Z12.31 BREAST CANCER SCREENING BY MAMMOGRAM: ICD-10-CM

## 2024-07-11 DIAGNOSIS — R68.82 DECREASED SEX DRIVE: ICD-10-CM

## 2024-07-11 PROCEDURE — 99396 PREV VISIT EST AGE 40-64: CPT

## 2024-07-11 PROCEDURE — 3078F DIAST BP <80 MM HG: CPT

## 2024-07-11 PROCEDURE — 88175 CYTOPATH C/V AUTO FLUID REDO: CPT

## 2024-07-11 PROCEDURE — 3008F BODY MASS INDEX DOCD: CPT

## 2024-07-11 PROCEDURE — 87624 HPV HI-RISK TYP POOLED RSLT: CPT

## 2024-07-11 PROCEDURE — 3075F SYST BP GE 130 - 139MM HG: CPT

## 2024-07-11 NOTE — PROGRESS NOTES
Elidia Olmstead is a 43 year old female  No LMP recorded (lmp unknown). Patient is postmenopausal.   Chief Complaint   Patient presents with    Wellness Visit     Annual Exam    Patient reports she doesn't have sexual desire.   .  She has sexual intercourse once a year. Her  sex drive has decreased as well.     OBSTETRICS HISTORY:  OB History    Para Term  AB Living   2 2 2     2   SAB IAB Ectopic Multiple Live Births           2      # Outcome Date GA Lbr Manoj/2nd Weight Sex Type Anes PTL Lv   2 Term 17 40w4d  8 lb 8 oz (3.855 kg) M CS-LTranv EPI N JUAN      Complications: Temp 100.4 or greater   1 Term 07 39w0d  8 lb 8 oz (3.856 kg) M CS-Unspec   JUAN       GYNE HISTORY:  Periods  none due to norethdrone      History   Sexual Activity    Sexual activity: Yes    Partners: Male    Birth control/ protection: OCP        Hx Prior Abnormal Pap: Yes (per patient 2 years abnormal pap)  Pap Date: 21  Pap Result Notes: Negative        MEDICAL HISTORY:  Past Medical History:    Abnormal uterine bleeding    Allergic rhinitis    Anxiety    Dense breasts    Heterogeneously dense    Depression    Dyspareunia    Endometriosis    surgically confirmed per patient report. No op note available.     Essential hypertension    Fibroids    Fibromyalgia    Galactorrhea    Mammogram & breast US with dilated retroareolar duct but otherwise normal. Prolactin normal 2020.     History of mammogram    Mammogram BILAT & US breast RIGHT - dense breasts, mildly dilated right retroareolar duct.     History of MRSA infection    History of pelvic ultrasound    22 Pelvic US - amenorrheic x 9 months on continuous progestin only OCP Slynd. Stable 1.5 cm subserosal left fundal fibroid. Small cysts bilateral ovaries (1.6 cm) consistent with follicles. Agree with previous visit suggestion to try NE 2.5 mg HS continuously to suppress ovulation better.     HPV vaccine counseling    Received HPV vaccine  series.     Hyperlipidemia    Hypothyroidism    Infertility associated with anovulation    IVF    Migraine with aura    No estrogen    Obesity    Pap smear for cervical cancer screening    Pap & HPV negative.     Post partum depression    Right ovarian cyst    Pelvic US 2020 - CD12. Probable functional cyst in the right ovary 2.9 cm. Tiny 18 mm subserosal left sided fundal fibroid (likely incidental).   22 Pelvic US - ovaries with 1.6 cm simple cysts/follicles bilaterally.      Subserosal leiomyoma of uterus    Incidental 1.8 cm left fundal submucous fibroid.     Vitamin D deficiency       SURGICAL HISTORY:  Past Surgical History:   Procedure Laterality Date                Colonoscopy      Laparoscopy,diagnostic  2016    Diagnosed with endometriosis. Stage 2 probably.        SOCIAL HISTORY:  Social History     Socioeconomic History    Marital status:      Spouse name: Not on file    Number of children: Not on file    Years of education: Not on file    Highest education level: Not on file   Occupational History    Occupation:    Tobacco Use    Smoking status: Never    Smokeless tobacco: Never   Vaping Use    Vaping status: Never Used   Substance and Sexual Activity    Alcohol use: Yes     Alcohol/week: 2.0 standard drinks of alcohol     Types: 2 Glasses of wine per week     Comment: Socially    Drug use: Never    Sexual activity: Yes     Partners: Male     Birth control/protection: OCP   Other Topics Concern     Service Not Asked    Blood Transfusions Not Asked    Caffeine Concern No    Occupational Exposure Not Asked    Hobby Hazards Not Asked    Sleep Concern Not Asked    Stress Concern Yes    Weight Concern Yes    Special Diet No    Back Care Not Asked    Exercise No    Bike Helmet Not Asked    Seat Belt No    Self-Exams Not Asked   Social History Narrative    Not on file     Social Determinants of Health     Financial Resource Strain: Not on file   Food  Insecurity: Not on file   Transportation Needs: Not on file   Physical Activity: Not on file   Stress: Not on file   Social Connections: Not on file   Housing Stability: Not on file       FAMILY HISTORY:  Family History   Problem Relation Age of Onset    Hypertension Mother     Asthma Mother     Cancer Father 72        LEUKEMIA    No Known Problems Maternal Grandmother     No Known Problems Maternal Grandfather     Diabetes Paternal Grandmother     Heart Disorder Paternal Grandmother     No Known Problems Paternal Grandfather     No Known Problems Son     No Known Problems Son        MEDICATIONS:    Current Outpatient Medications:     norethindrone 5 MG Oral Tab, Take 0.5 tablets (2.5 mg total) by mouth daily., Disp: 45 tablet, Rfl: 0    buPROPion ER (WELLBUTRIN XL) 150 MG Oral Tablet 24 Hr, Take 1 tablet (150 mg total) by mouth every morning., Disp: 30 tablet, Rfl: 3    clonazePAM 0.5 MG Oral Tab, Take 1 tablet (0.5 mg total) by mouth 2 (two) times daily., Disp: 60 tablet, Rfl: 3    OLANZapine 5 MG Oral Tab, Take 1 tablet (5 mg total) by mouth nightly., Disp: 30 tablet, Rfl: 3    Albuterol Sulfate 108 (90 Base) MCG/ACT Inhalation Aerosol Powder, Breath Activated, Inhale 1 puff into the lungs as needed., Disp: , Rfl:     Naratriptan HCl 2.5 MG Oral Tab, Take 1 tablet (2.5 mg total) by mouth as needed for Migraine (Take every 4 hours for migraines - max 2 tablets/24 hours)., Disp: 30 tablet, Rfl: 1    Ascorbic Acid (VITAMIN C OR), Take by mouth., Disp: , Rfl:     Cholecalciferol (VITAMIN D) 1000 units Oral Tab, Take by mouth., Disp: , Rfl:     methylPREDNISolone (MEDROL) 4 MG Oral Tablet Therapy Pack, As directed. (Patient not taking: Reported on 7/11/2024), Disp: 21 each, Rfl: 0    guaiFENesin-codeine 100-10 MG/5ML Oral Solution, Take 5 mL by mouth nightly as needed for cough or congestion. (Patient not taking: Reported on 7/11/2024), Disp: 118 mL, Rfl: 0    azelastine 0.1 % Nasal Solution, 1 spray by Nasal route 2  (two) times daily. (Patient not taking: Reported on 7/11/2024), Disp: 30 mL, Rfl: 0    Meloxicam 15 MG Oral Tab, Take 1 tablet (15 mg total) by mouth daily with breakfast. (Patient not taking: Reported on 7/11/2024), Disp: 30 tablet, Rfl: 2    levothyroxine 50 MCG Oral Tab, Take 1 tablet (50 mcg total) by mouth before breakfast. (Patient not taking: Reported on 7/11/2024), Disp: 90 tablet, Rfl: 0    ALLERGIES:    Allergies   Allergen Reactions    Other NAUSEA AND VOMITING and UNKNOWN     Coconut    Seasonal UNKNOWN         Review of Systems:  Constitutional:  Denies fatigue, night sweats, hot flashes  Eyes:  denies blurred or double vision  Cardiovascular:  denies chest pain or palpitations  Respiratory:  denies shortness of breath  Gastrointestinal:  denies heartburn, abdominal pain, diarrhea or constipation  Genitourinary:  denies dysuria, incontinence, abnormal vaginal discharge, vaginal itching  Musculoskeletal:  denies back pain.  Skin/Breast:  Denies any breast pain, lumps, or discharge.   Neurological:  denies headaches, extremity weakness or numbness.  Psychiatric: denies depression or anxiety.  Endocrine:   denies excessive thirst or urination.  Heme/Lymph:  denies history of anemia, easy bruising or bleeding.      PHYSICAL EXAM:   Constitutional: well developed, well nourished  Head/Face: normocephalic  Neck/Thyroid: thyroid symmetric, no thyromegaly, no nodules, no adenopathy  Lymphatic:no abnormal supraclavicular or axillary adenopathy is noted  Breast: normal without palpable masses, tenderness, asymmetry, nipple discharge, nipple retraction or skin changes  Abdomen:  soft, nontender, nondistended, no masses  Skin/Hair: no unusual rashes or bruises  Extremities: no edema, no cyanosis  Psychiatric:  Oriented to time, place, person and situation. Appropriate mood and affect    Pelvic Exam:  External Genitalia: normal appearance, hair distribution, and no lesions  Urethral Meatus:  normal in size,  location, without lesions and prolapse  Bladder:  No fullness, masses or tenderness  Vagina:  Normal appearance without lesions, no abnormal discharge  Cervix:  Normal without tenderness on motion  Uterus: normal in size, contour, position, mobility, without tenderness  Adnexa: normal without masses or tenderness  Perineum: normal  Anus: no hemorroids     Assessment & Plan:  Diagnoses and all orders for this visit:    Encounter for well woman exam with routine gynecological exam    Cervical cancer screening  -     Hpv High Risk , Thin Prep Collect; Future  -     ThinPrep PAP Smear B; Future    Breast cancer screening by mammogram  -     [facility] FARHAT 2D+3D SCREENING BILAT (CPT=77067/30835); Future    Decreased sex drive      -discussed Ristela and sex therapy. Will consider.

## 2024-07-12 LAB — HPV E6+E7 MRNA CVX QL NAA+PROBE: NEGATIVE

## 2024-07-15 LAB
.: NORMAL
.: NORMAL

## 2024-08-13 ENCOUNTER — OFFICE VISIT (OUTPATIENT)
Dept: INTERNAL MEDICINE CLINIC | Facility: CLINIC | Age: 44
End: 2024-08-13
Payer: COMMERCIAL

## 2024-08-13 VITALS
TEMPERATURE: 98 F | SYSTOLIC BLOOD PRESSURE: 118 MMHG | DIASTOLIC BLOOD PRESSURE: 86 MMHG | WEIGHT: 225 LBS | RESPIRATION RATE: 16 BRPM | HEART RATE: 92 BPM | OXYGEN SATURATION: 99 % | BODY MASS INDEX: 41.94 KG/M2 | HEIGHT: 61.5 IN

## 2024-08-13 DIAGNOSIS — E55.9 VITAMIN D DEFICIENCY: ICD-10-CM

## 2024-08-13 DIAGNOSIS — E03.9 HYPOTHYROIDISM, UNSPECIFIED TYPE: Chronic | ICD-10-CM

## 2024-08-13 DIAGNOSIS — G47.33 OBSTRUCTIVE SLEEP APNEA: ICD-10-CM

## 2024-08-13 DIAGNOSIS — Z00.00 PREVENTATIVE HEALTH CARE: ICD-10-CM

## 2024-08-13 DIAGNOSIS — E66.01 MORBID OBESITY (HCC): ICD-10-CM

## 2024-08-13 DIAGNOSIS — E78.2 MIXED HYPERLIPIDEMIA: Primary | Chronic | ICD-10-CM

## 2024-08-13 PROCEDURE — 3079F DIAST BP 80-89 MM HG: CPT | Performed by: INTERNAL MEDICINE

## 2024-08-13 PROCEDURE — 3008F BODY MASS INDEX DOCD: CPT | Performed by: INTERNAL MEDICINE

## 2024-08-13 PROCEDURE — 99214 OFFICE O/P EST MOD 30 MIN: CPT | Performed by: INTERNAL MEDICINE

## 2024-08-13 PROCEDURE — 3074F SYST BP LT 130 MM HG: CPT | Performed by: INTERNAL MEDICINE

## 2024-08-13 NOTE — PROGRESS NOTES
Elidia Olmstead is a 43 year old female.   HPI:   Patient presents for follow up on several issues.    Eating healthier.  Trying to be more active.  Still hard to lose weight.   In fact has been dealing with more rapid weight gain.  Past two months she has noticed significant weight gain.  Up clothes sizes as well. No diarrhea/constipation.   No abdominal pain.  No fevers/chills/sweats.      Other chronic issues:  Hyperlipidemia - lifestyle controlled.  Hypothyroidism - has been off levothyroxine for over a year.  Vitamin D deficiency - not on supplementation.  Obstructive sleep apnea - on CPAP; feels better energy wise when she uses CPAP at night.    Past medical, family, surgical and social history were reviewed as listed in the chart, and are unchanged from previous visit.  REVIEW OF SYSTEMS:   GENERAL/ const: occasional fatigue; no fevers/chills, no unintentional weight loss  EYES:no vision problems  HEENT: denies sinus pain or sinus tenderness  LUNGS: denies shortness of breath   CARDIOVASCULAR: denies chest pain  GI: abdominal bloating/swelling; denies nausea/emesis/ abdominal pain diarrhea constipation  : denies dysuria   MUSCULOSKELETAL: no acute arthralgias  NEURO: denies headaches  PSYCHIATRIC: bipolar disorder  ENDOCRINE: no hot/cold intolerance  ALLERGY:   Allergies   Allergen Reactions    Other NAUSEA AND VOMITING and UNKNOWN     Coconut    Seasonal UNKNOWN     PAST HISTORY:     Current Outpatient Medications:     clonazePAM 0.5 MG Oral Tab, Take 1 tablet (0.5 mg total) by mouth 2 (two) times daily., Disp: 60 tablet, Rfl: 0    norethindrone 5 MG Oral Tab, Take 0.5 tablets (2.5 mg total) by mouth daily., Disp: 45 tablet, Rfl: 0    buPROPion ER (WELLBUTRIN XL) 150 MG Oral Tablet 24 Hr, Take 1 tablet (150 mg total) by mouth every morning., Disp: 30 tablet, Rfl: 3    OLANZapine 5 MG Oral Tab, Take 1 tablet (5 mg total) by mouth nightly., Disp: 30 tablet, Rfl: 3    Albuterol Sulfate 108 (90 Base) MCG/ACT  Inhalation Aerosol Powder, Breath Activated, Inhale 1 puff into the lungs as needed., Disp: , Rfl:     Naratriptan HCl 2.5 MG Oral Tab, Take 1 tablet (2.5 mg total) by mouth as needed for Migraine (Take every 4 hours for migraines - max 2 tablets/24 hours)., Disp: 30 tablet, Rfl: 1  Medical:  has a past medical history of Abnormal uterine bleeding, Allergic rhinitis, Anxiety, Dense breasts (2021), Depression (), Dyspareunia, Endometriosis (), Essential hypertension, Fibroids, Fibromyalgia, Galactorrhea (2020), History of mammogram (2020), History of MRSA infection, History of pelvic ultrasound (2022), HPV vaccine counseling, Hyperlipidemia (), Hypothyroidism, Infertility associated with anovulation, Migraine with aura, Obesity (), Pap smear for cervical cancer screening (2021), Post partum depression, Right ovarian cyst (2020), Subserosal leiomyoma of uterus (2020), and Vitamin D deficiency ().  Surgical:  has a past surgical history that includes  (); laparoscopy,diagnostic ();  (); and colonoscopy ().  Family: family history includes Asthma in her mother; Cancer (age of onset: 72) in her father; Diabetes in her paternal grandmother; Heart Disorder in her paternal grandmother; Hypertension in her mother; No Known Problems in her maternal grandfather, maternal grandmother, paternal grandfather, son, and son.  Social:  reports that she has never smoked. She has never used smokeless tobacco. She reports current alcohol use of about 2.0 standard drinks of alcohol per week. She reports that she does not use drugs.  Wt Readings from Last 6 Encounters:   24 225 lb (102.1 kg)   24 221 lb 9.6 oz (100.5 kg)   10/09/23 218 lb (98.9 kg)   23 214 lb 12.8 oz (97.4 kg)   23 213 lb 13.5 oz (97 kg)   23 211 lb (95.7 kg)     EXAM:   /86 (BP Location: Right arm, Patient Position: Sitting, Cuff Size: large)    Pulse 92   Temp 98.3 °F (36.8 °C) (Temporal)   Resp 16   Ht 5' 1.5\" (1.562 m)   Wt 225 lb (102.1 kg)   LMP  (LMP Unknown)   SpO2 99%   Breastfeeding No   BMI 41.82 kg/m²   GENERAL: Alert and oriented, well developed, well nourished,in no apparent distress  HEENT: atraumatic, PERRLA, EOMI, normal lid and conjunctiva  LUNGS: clear to auscultation bilaterally, no wheezing/rubs  CARDIO: RRR without murmurs.  No clubbing, cyanosis or edema.  GI: soft non tender nondistended no hepatosplenomegaly, bowel sounds throughout  NEURO: CN II-XII intact, 5/5 strength all extremities  PSYCH: pleasant, appropriate mood and affect  ASSESSMENT AND PLAN:   1. Mixed hyperlipidemia  Lifestyle controlled.  Will check lipid panel.  - Lipid Panel; Future    2. Hypothyroidism, unspecified type  Patient stopped levothyroxine, has been off medication for over a year.  Will check updated TSH.  - TSH W Reflex To Free T4; Future    3. Vitamin D deficiency  Not on supplementation.  Will check vitamin D levels.  - Vitamin D; Future    4. Obstructive sleep apnea  Now on CPAP therapy, though does not use every night.  Discussed importance of nightly use.    5. Morbid obesity (HCC)  Body mass index is 41.82 kg/m².  Trouble losing weight despite watching diet.  Will check labs as below.  Some abdominal swelling as well.  If labs unremarkable, will check US abdomen.  Will also try trial of metformin or topiramate, and refer to [facility].  - TSH W Reflex To Free T4; Future  - Vitamin D; Future  - Vitamin B12; Future  - Folic Acid Serum (Folate); Future    6. Preventative health care  Screening labs ordered as below.  Reminded patient to schedule mammogram.  - CBC With Differential With Platelet; Future  - Comp Metabolic Panel (14); Future  - Hemoglobin A1C; Future  - Lipid Panel; Future  - TSH W Reflex To Free T4; Future    Patient Care Team:  Paula Irwin MD as PCP - General (Internal Medicine)  Landy Jones MD  (OBSTETRICS & GYNECOLOGY)  Edda Miranda DO (OBSTETRICS & GYNECOLOGY)  Alice Escobar MD as Obstetrician (OBSTETRICS & GYNECOLOGY)  Aris Vegas DO as Psychiatrist/APN (Psychiatry)  The patient indicates understanding of these issues and agrees to the plan.  The patient is asked to return to clinic in 3-6 months for follow up on chronic issues, or earlier if acute issues arise.    Paula Irwin MD

## 2024-08-13 NOTE — PATIENT INSTRUCTIONS
- Get blood tests done when fasting (water and medications only for 8 hours)  - Schedule mammogram  - You can schedule mammogram and lab appointments through [facility] or call central scheduling at [phone].  - If lab tests are ok we will try some medications/pills for weight, likely also check an abdominal ultrasound, refer you to our weight loss clinic.    It was a pleasure seeing you in the clinic today.  Thank you for choosing the [facility] office for your healthcare needs. Please call at [phone] with any questions or concerns.    Paula Irwin MD

## 2024-08-14 PROBLEM — G47.33 OBSTRUCTIVE SLEEP APNEA: Status: ACTIVE | Noted: 2024-08-14

## 2024-08-17 ENCOUNTER — LAB ENCOUNTER (OUTPATIENT)
Dept: LAB | Age: 44
End: 2024-08-17
Attending: INTERNAL MEDICINE
Payer: COMMERCIAL

## 2024-08-17 DIAGNOSIS — Z00.00 PREVENTATIVE HEALTH CARE: ICD-10-CM

## 2024-08-17 DIAGNOSIS — E55.9 VITAMIN D DEFICIENCY: ICD-10-CM

## 2024-08-17 DIAGNOSIS — E66.01 MORBID OBESITY (HCC): ICD-10-CM

## 2024-08-17 DIAGNOSIS — E78.2 MIXED HYPERLIPIDEMIA: Chronic | ICD-10-CM

## 2024-08-17 DIAGNOSIS — E03.9 HYPOTHYROIDISM, UNSPECIFIED TYPE: Chronic | ICD-10-CM

## 2024-08-17 LAB
ALBUMIN SERPL-MCNC: 4.8 G/DL (ref 3.2–4.8)
ALBUMIN/GLOB SERPL: 1.5 {RATIO} (ref 1–2)
ALP LIVER SERPL-CCNC: 85 U/L
ALT SERPL-CCNC: 20 U/L
ANION GAP SERPL CALC-SCNC: 8 MMOL/L (ref 0–18)
AST SERPL-CCNC: 16 U/L (ref ?–34)
BASOPHILS # BLD AUTO: 0.07 X10(3) UL (ref 0–0.2)
BASOPHILS NFR BLD AUTO: 0.7 %
BILIRUB SERPL-MCNC: 0.3 MG/DL (ref 0.3–1.2)
BUN BLD-MCNC: 16 MG/DL (ref 9–23)
CALCIUM BLD-MCNC: 9.9 MG/DL (ref 8.7–10.4)
CHLORIDE SERPL-SCNC: 111 MMOL/L (ref 98–112)
CHOLEST SERPL-MCNC: 208 MG/DL (ref ?–200)
CO2 SERPL-SCNC: 22 MMOL/L (ref 21–32)
CREAT BLD-MCNC: 1.07 MG/DL
EGFRCR SERPLBLD CKD-EPI 2021: 66 ML/MIN/1.73M2 (ref 60–?)
EOSINOPHIL # BLD AUTO: 0.1 X10(3) UL (ref 0–0.7)
EOSINOPHIL NFR BLD AUTO: 1.1 %
ERYTHROCYTE [DISTWIDTH] IN BLOOD BY AUTOMATED COUNT: 14 %
EST. AVERAGE GLUCOSE BLD GHB EST-MCNC: 120 MG/DL (ref 68–126)
FASTING PATIENT LIPID ANSWER: YES
FASTING STATUS PATIENT QL REPORTED: YES
FOLATE SERPL-MCNC: >24 NG/ML (ref 5.4–?)
GLOBULIN PLAS-MCNC: 3.3 G/DL (ref 2–3.5)
GLUCOSE BLD-MCNC: 96 MG/DL (ref 70–99)
HBA1C MFR BLD: 5.8 % (ref ?–5.7)
HCT VFR BLD AUTO: 40.2 %
HDLC SERPL-MCNC: 43 MG/DL (ref 40–59)
HGB BLD-MCNC: 13 G/DL
IMM GRANULOCYTES # BLD AUTO: 0.04 X10(3) UL (ref 0–1)
IMM GRANULOCYTES NFR BLD: 0.4 %
LDLC SERPL CALC-MCNC: 146 MG/DL (ref ?–100)
LYMPHOCYTES # BLD AUTO: 2.88 X10(3) UL (ref 1–4)
LYMPHOCYTES NFR BLD AUTO: 30.4 %
MCH RBC QN AUTO: 28.1 PG (ref 26–34)
MCHC RBC AUTO-ENTMCNC: 32.3 G/DL (ref 31–37)
MCV RBC AUTO: 86.8 FL
MONOCYTES # BLD AUTO: 0.6 X10(3) UL (ref 0.1–1)
MONOCYTES NFR BLD AUTO: 6.3 %
NEUTROPHILS # BLD AUTO: 5.77 X10 (3) UL (ref 1.5–7.7)
NEUTROPHILS # BLD AUTO: 5.77 X10(3) UL (ref 1.5–7.7)
NEUTROPHILS NFR BLD AUTO: 61.1 %
NONHDLC SERPL-MCNC: 165 MG/DL (ref ?–130)
OSMOLALITY SERPL CALC.SUM OF ELEC: 293 MOSM/KG (ref 275–295)
PLATELET # BLD AUTO: 421 10(3)UL (ref 150–450)
POTASSIUM SERPL-SCNC: 4.1 MMOL/L (ref 3.5–5.1)
PROT SERPL-MCNC: 8.1 G/DL (ref 5.7–8.2)
RBC # BLD AUTO: 4.63 X10(6)UL
SODIUM SERPL-SCNC: 141 MMOL/L (ref 136–145)
TRIGL SERPL-MCNC: 105 MG/DL (ref 30–149)
TSI SER-ACNC: 2.35 MIU/ML (ref 0.55–4.78)
VIT B12 SERPL-MCNC: 377 PG/ML (ref 211–911)
VIT D+METAB SERPL-MCNC: 34.8 NG/ML (ref 30–100)
VLDLC SERPL CALC-MCNC: 20 MG/DL (ref 0–30)
WBC # BLD AUTO: 9.5 X10(3) UL (ref 4–11)

## 2024-08-17 PROCEDURE — 82306 VITAMIN D 25 HYDROXY: CPT

## 2024-08-17 PROCEDURE — 85025 COMPLETE CBC W/AUTO DIFF WBC: CPT

## 2024-08-17 PROCEDURE — 80061 LIPID PANEL: CPT

## 2024-08-17 PROCEDURE — 36415 COLL VENOUS BLD VENIPUNCTURE: CPT

## 2024-08-17 PROCEDURE — 82607 VITAMIN B-12: CPT

## 2024-08-17 PROCEDURE — 83036 HEMOGLOBIN GLYCOSYLATED A1C: CPT

## 2024-08-17 PROCEDURE — 84443 ASSAY THYROID STIM HORMONE: CPT

## 2024-08-17 PROCEDURE — 80053 COMPREHEN METABOLIC PANEL: CPT

## 2024-08-17 PROCEDURE — 82746 ASSAY OF FOLIC ACID SERUM: CPT

## 2024-08-19 DIAGNOSIS — R19.00 ABDOMINAL SWELLING: ICD-10-CM

## 2024-08-19 DIAGNOSIS — E66.01 MORBID OBESITY (HCC): ICD-10-CM

## 2024-08-19 DIAGNOSIS — R73.03 PREDIABETES: Primary | ICD-10-CM

## 2024-08-22 ENCOUNTER — TELEPHONE (OUTPATIENT)
Dept: INTERNAL MEDICINE CLINIC | Facility: CLINIC | Age: 44
End: 2024-08-22

## 2024-08-22 RX ORDER — POLYMYXIN B SULFATE AND TRIMETHOPRIM 1; 10000 MG/ML; [USP'U]/ML
1 SOLUTION OPHTHALMIC EVERY 6 HOURS
Qty: 10 ML | Refills: 0 | Status: SHIPPED | OUTPATIENT
Start: 2024-08-22

## 2024-08-22 NOTE — TELEPHONE ENCOUNTER
PT called in asking for rx for pink eye. Said it was addressed at her appt with RP and it is getting bothersome.

## 2024-08-24 ENCOUNTER — HOSPITAL ENCOUNTER (OUTPATIENT)
Dept: MAMMOGRAPHY | Age: 44
Discharge: HOME | End: 2024-08-24
Payer: COMMERCIAL

## 2024-08-24 DIAGNOSIS — Z12.31 BREAST CANCER SCREENING BY MAMMOGRAM: ICD-10-CM

## 2024-08-24 PROCEDURE — 77067 SCR MAMMO BI INCL CAD: CPT

## 2024-08-24 PROCEDURE — 77063 BREAST TOMOSYNTHESIS BI: CPT

## 2024-09-09 ENCOUNTER — HOSPITAL ENCOUNTER (OUTPATIENT)
Dept: MAMMOGRAPHY | Facility: HOSPITAL | Age: 44
Discharge: HOME | End: 2024-09-09
Payer: COMMERCIAL

## 2024-09-09 DIAGNOSIS — R92.2 INCONCLUSIVE MAMMOGRAM: ICD-10-CM

## 2024-09-09 PROCEDURE — 77066 DX MAMMO INCL CAD BI: CPT

## 2024-09-09 PROCEDURE — 77062 BREAST TOMOSYNTHESIS BI: CPT

## 2024-09-09 PROCEDURE — 76642 ULTRASOUND BREAST LIMITED: CPT

## 2024-09-09 PROCEDURE — 77065 DX MAMMO INCL CAD UNI: CPT

## 2024-09-09 PROCEDURE — 77061 BREAST TOMOSYNTHESIS UNI: CPT

## 2024-09-22 ENCOUNTER — HOSPITAL ENCOUNTER (OUTPATIENT)
Dept: ULTRASOUND IMAGING | Age: 44
End: 2024-09-22
Attending: INTERNAL MEDICINE
Payer: COMMERCIAL

## 2024-09-22 ENCOUNTER — HOSPITAL ENCOUNTER (OUTPATIENT)
Dept: ULTRASOUND IMAGING | Age: 44
Discharge: HOME | End: 2024-09-22
Attending: INTERNAL MEDICINE
Payer: COMMERCIAL

## 2024-09-22 DIAGNOSIS — R19.00 ABDOMINAL SWELLING: ICD-10-CM

## 2024-09-22 PROCEDURE — 76700 US EXAM ABDOM COMPLETE: CPT | Performed by: INTERNAL MEDICINE

## 2024-09-23 PROBLEM — K80.20 GALLSTONES: Status: ACTIVE | Noted: 2024-09-23

## 2024-09-26 ENCOUNTER — PATIENT MESSAGE (OUTPATIENT)
Dept: INTERNAL MEDICINE CLINIC | Facility: CLINIC | Age: 44
End: 2024-09-26

## 2024-09-26 DIAGNOSIS — K80.20 CALCULUS OF GALLBLADDER WITHOUT CHOLECYSTITIS WITHOUT OBSTRUCTION: Primary | ICD-10-CM

## 2024-09-27 NOTE — TELEPHONE ENCOUNTER
RP: tried calling pt to triage symptoms further. LVM.   Based on original message, UC this weekend?

## 2024-09-27 NOTE — TELEPHONE ENCOUNTER
From: Elidia Olmstead  To: Paula Irwin  Sent: 9/26/2024 9:54 PM CDT  Subject: gallbladder    Im having pain after eating, right upper abdominal and also during the day. Today I had a stool w blood not sure is related. What's the next step?

## 2024-09-27 NOTE — TELEPHONE ENCOUNTER
Pt called back:   Pt stated for the past 6 weeks she has felt abdominal bloating all the time.   Pain in upper right quadrant comes and goes. Worse after she eats or at night. Pain ranges from a 3-5 out of 10.   Yesterday one episode of blood in stool. Had a normal BM after that.   No urinary issues, no fever/chills.     Based off US result, refer to specialist?

## 2024-10-08 RX ORDER — NORETHINDRONE 5 MG/1
2.5 TABLET ORAL DAILY
Qty: 45 TABLET | Refills: 2 | Status: SHIPPED | OUTPATIENT
Start: 2024-10-08

## 2024-10-22 ENCOUNTER — OFFICE VISIT (OUTPATIENT)
Facility: LOCATION | Age: 44
End: 2024-10-22
Payer: COMMERCIAL

## 2024-10-22 VITALS
RESPIRATION RATE: 20 BRPM | OXYGEN SATURATION: 99 % | HEART RATE: 91 BPM | SYSTOLIC BLOOD PRESSURE: 134 MMHG | DIASTOLIC BLOOD PRESSURE: 93 MMHG | TEMPERATURE: 98 F

## 2024-10-22 DIAGNOSIS — K80.10 CALCULUS OF GALLBLADDER WITH CHOLECYSTITIS WITHOUT BILIARY OBSTRUCTION, UNSPECIFIED CHOLECYSTITIS ACUITY: Primary | ICD-10-CM

## 2024-10-22 PROCEDURE — 3080F DIAST BP >= 90 MM HG: CPT | Performed by: SURGERY

## 2024-10-22 PROCEDURE — 3075F SYST BP GE 130 - 139MM HG: CPT | Performed by: SURGERY

## 2024-10-22 PROCEDURE — 99204 OFFICE O/P NEW MOD 45 MIN: CPT | Performed by: SURGERY

## 2024-10-22 NOTE — H&P
Elidia Olmstead is a 43 year old female  Chief Complaint   Patient presents with    New Patient     NP- Gallbladder Consult, US Abd on 9/22/24- pain on left sided abdomen  and nausea        REFERRED BY    Patient presents with  ruq pain for three months assoc with nausea following eating greasy food patient states the pain is 8 out of 10 and is in the epigastric and right upper quadrant area radiating right subcostal later this is associated with belching and bloating.  Patient has had multiple attacks.  She states the pain lasts for a few hours to a half of a day and then resolves.  She denies change in bowel habits       .           Past Medical History:    Abnormal uterine bleeding    Allergic rhinitis    Anxiety    Dense breasts    Heterogeneously dense    Depression    Dyspareunia    Endometriosis    surgically confirmed per patient report. No op note available.     Essential hypertension    Fibroids    Fibromyalgia    Galactorrhea    Mammogram & breast US with dilated retroareolar duct but otherwise normal. Prolactin normal 9/2020.     History of mammogram    Mammogram BILAT & US breast RIGHT - dense breasts, mildly dilated right retroareolar duct.     History of MRSA infection    History of pelvic ultrasound    6/8/22 Pelvic US - amenorrheic x 9 months on continuous progestin only OCP Slynd. Stable 1.5 cm subserosal left fundal fibroid. Small cysts bilateral ovaries (1.6 cm) consistent with follicles. Agree with previous visit suggestion to try NE 2.5 mg HS continuously to suppress ovulation better.     HPV vaccine counseling    Received HPV vaccine series.     Hyperlipidemia    Hypothyroidism    Infertility associated with anovulation    IVF    Migraine with aura    No estrogen    Obesity    Pap smear for cervical cancer screening    Pap & HPV negative.     Post partum depression    Right ovarian cyst    Pelvic US 8/7/2020 - CD12. Probable functional cyst in the right ovary 2.9 cm. Tiny 18 mm subserosal  left sided fundal fibroid (likely incidental).   22 Pelvic US - ovaries with 1.6 cm simple cysts/follicles bilaterally.      Subserosal leiomyoma of uterus    Incidental 1.8 cm left fundal submucous fibroid.     Vitamin D deficiency     Past Surgical History:   Procedure Laterality Date                Colonoscopy      Laparoscopy,diagnostic  2016    Diagnosed with endometriosis. Stage 2 probably.      Medications Ordered Prior to Encounter[1]  @ALL@  Family History   Problem Relation Age of Onset    Breast Cancer Mother 40    Hypertension Mother     Asthma Mother     Cancer Father 72        LEUKEMIA    No Known Problems Son     No Known Problems Son     No Known Problems Maternal Grandmother     No Known Problems Maternal Grandfather     Diabetes Paternal Grandmother     Heart Disorder Paternal Grandmother     No Known Problems Paternal Grandfather      Social History     Socioeconomic History    Marital status:    Occupational History    Occupation:    Tobacco Use    Smoking status: Never    Smokeless tobacco: Never   Vaping Use    Vaping status: Never Used   Substance and Sexual Activity    Alcohol use: Yes     Alcohol/week: 2.0 standard drinks of alcohol     Types: 2 Glasses of wine per week     Comment: Socially    Drug use: Never    Sexual activity: Yes     Partners: Male     Birth control/protection: OCP   Other Topics Concern    Caffeine Concern No    Stress Concern Yes    Weight Concern Yes    Special Diet No    Exercise No    Seat Belt No       ROS     GENERAL HEALTH: otherwise feels well, no weight loss, no fever or chills  SKIN: denies any unusual skin rashes or jaundice  HEENT: denies nasal congestion, sinus pain or sore throat; hearing loss negative,   EYES , no diplopia or vision changes  RESPIRATORY: denies shortness of breath, wheezing or cough   CARDIOVASCULAR: denies chest pain or NOBLE; no palpitations   GI: denies nausea, vomiting, constipation,  diarrhea; no rectal bleeding; no heartburn  GENITAL/: no dysuria, urgency or frequency, no tea colored urine  MUSCULOSKELETAL: no joint complaints upper or lower extremities  HEMATOLOGY: denies hx anemia; denies bruising or excessive bleeding  Endocrine: no weight gain or loss no hot or cold intolerance    EXAM     Blood pressure (!) 134/93, pulse 91, temperature 98.3 °F (36.8 °C), temperature source Temporal, resp. rate 20, SpO2 99%, not currently breastfeeding.  GENERAL: well developed, well nourished female, in no apparent distress.    MENTAL STATUS :Alert, oriented x 3  PSYCH: normal mood and affect  SKIN: anicteric, no rashes, no bruising  EYES: PERRLA, EOMI, sclera anicteric,  conjunctiva without pallor  HEENT: normocephalic, atraumatic, TMs clear, nares patent, mouth moist, pharynx w/o erythema  NECK: supple, no JVD, no adenopathy, no thyromegaly  RESPIRATORY: clear to auscultation, no rales , rhonchi or wheezing  CARDIOVASCULAR: RRR, murmur negative  ABDOMEN: normal active BS, soft, nondistended  no HSM, no masses or hernias  LYMPHATIC: no axillary , supraclavicular or inguinal lymphadenopathy  EXTREMITIES: no cyanosis, clubbing or edema, no atrophy, full ROM    STUDIES:     [facility] Lab Encounter on 08/17/2024   Component Date Value Ref Range Status    WBC 08/17/2024 9.5  4.0 - 11.0 x10(3) uL Final    RBC 08/17/2024 4.63  3.80 - 5.30 x10(6)uL Final    HGB 08/17/2024 13.0  12.0 - 16.0 g/dL Final    HCT 08/17/2024 40.2  35.0 - 48.0 % Final    PLT 08/17/2024 421.0  150.0 - 450.0 10(3)uL Final    MCV 08/17/2024 86.8  80.0 - 100.0 fL Final    MCH 08/17/2024 28.1  26.0 - 34.0 pg Final    MCHC 08/17/2024 32.3  31.0 - 37.0 g/dL Final    RDW 08/17/2024 14.0  % Final    Neutrophil Absolute Prelim 08/17/2024 5.77  1.50 - 7.70 x10 (3) uL Final    Neutrophil Absolute 08/17/2024 5.77  1.50 - 7.70 x10(3) uL Final    Lymphocyte Absolute 08/17/2024 2.88  1.00 - 4.00 x10(3) uL Final    Monocyte Absolute 08/17/2024 0.60  0.10 -  1.00 x10(3) uL Final    Eosinophil Absolute 08/17/2024 0.10  0.00 - 0.70 x10(3) uL Final    Basophil Absolute 08/17/2024 0.07  0.00 - 0.20 x10(3) uL Final    Immature Granulocyte Absolute 08/17/2024 0.04  0.00 - 1.00 x10(3) uL Final    Neutrophil % 08/17/2024 61.1  % Final    Lymphocyte % 08/17/2024 30.4  % Final    Monocyte % 08/17/2024 6.3  % Final    Eosinophil % 08/17/2024 1.1  % Final    Basophil % 08/17/2024 0.7  % Final    Immature Granulocyte % 08/17/2024 0.4  % Final    Glucose 08/17/2024 96  70 - 99 mg/dL Final    Sodium 08/17/2024 141  136 - 145 mmol/L Final    Potassium 08/17/2024 4.1  3.5 - 5.1 mmol/L Final    Chloride 08/17/2024 111  98 - 112 mmol/L Final    CO2 08/17/2024 22.0  21.0 - 32.0 mmol/L Final    Anion Gap 08/17/2024 8  0 - 18 mmol/L Final    BUN 08/17/2024 16  9 - 23 mg/dL Final    Creatinine 08/17/2024 1.07 (H)  0.55 - 1.02 mg/dL Final    Calcium, Total 08/17/2024 9.9  8.7 - 10.4 mg/dL Final    Calculated Osmolality 08/17/2024 293  275 - 295 mOsm/kg Final    eGFR-Cr 08/17/2024 66  >=60 mL/min/1.73m2 Final    AST 08/17/2024 16  <34 U/L Final    ALT 08/17/2024 20  10 - 49 U/L Final    Alkaline Phosphatase 08/17/2024 85  37 - 98 U/L Final    Bilirubin, Total 08/17/2024 0.3  0.3 - 1.2 mg/dL Final    Total Protein 08/17/2024 8.1  5.7 - 8.2 g/dL Final    Albumin 08/17/2024 4.8  3.2 - 4.8 g/dL Final    Globulin  08/17/2024 3.3  2.0 - 3.5 g/dL Final    A/G Ratio 08/17/2024 1.5  1.0 - 2.0 Final    Patient Fasting for CMP? 08/17/2024 Yes   Final    HgbA1C 08/17/2024 5.8 (H)  <5.7 % Final     Normal HbA1C:     <5.7%      Pre-Diabetic:     5.7 - 6.4%      Diabetic:         >6.4%      Diabetic Control: <7.0%        Estimated Average Glucose 08/17/2024 120  68 - 126 mg/dL Final    eAG is the estimated average glucose calculated from Hgb A1c according to the formula recommended by the American Diabetes Association. eAG levels reflect the long term average glucose and may not correlate with random or  fasting glucose levels since these represent specific points in time.           Cholesterol, Total 08/17/2024 208 (H)  <200 mg/dL Final    Desirable  <200 mg/dL  Borderline  200-239 mg/dL  High      >=240 mg/dL        HDL Cholesterol 08/17/2024 43  40 - 59 mg/dL Final    Interpretive Information:   An HDL cholesterol <40 mg/dL is low and constitutes a coronary heart disease risk factor. An HDL cholesterol >60 mg/dL is a negative risk factor for coronary heart disease.        Triglycerides 08/17/2024 105  30 - 149 mg/dL Final    Reference interval for fasting triglycerides  Desirable: <150 mg/dL  Borderline: 150-199 mg/dL  High: 200-499 mg/dL  Very High: >=500 mg/dL          LDL Cholesterol 08/17/2024 146 (H)  <100 mg/dL Final    Optimal            <100 mg/dL   Near/Above OptimaL 100-129 mg/dL   Borderline High    130-159 mg/dL   High               160-189 mg/dL    Very High          >190 mg/dL         VLDL 08/17/2024 20  0 - 30 mg/dL Final    Non HDL Chol 08/17/2024 165 (H)  <130 mg/dL Final    Desirable  <130 mg/dL   Borderline  130-159 mg/dL   High        160-189 mg/dL       Very high >=190 mg/dL        Patient Fasting for Lipid? 08/17/2024 Yes   Final    TSH 08/17/2024 2.348  0.550 - 4.780 mIU/mL Final    Vitamin B12 08/17/2024 377  211 - 911 pg/mL Final    Vitamin B12 Reference Range   Deficient:      <150 pg/mL   Indeterminate   150 - 211 pg/mL  Normal:         211 - 911 pg/mL       Folate (Folic Acid) 08/17/2024 >24.0  >5.4 ng/mL Final    This test may exhibit interference when a sample is collected from a person who is consuming high dose of biotin (a.k.a., vitamin B7, vitamin H, coenzyme R) supplements resulting in serum concentrations >50 ng/mL.  Intake of the recommended daily allowance (RDA) for biotin (0.03 mg) has not been shown to typically cause significant interference; however, high dose daily dietary supplements may contain biotin concentrations greater than 150 times (5-10 mg) the RDA.  It is  recommended that physicians ask all patients who may be on biotin supplementation to stop biotin consumption at least 72 hours prior to collection of a new sample.      Vitamin D, 25OH, Total 08/17/2024 34.8  30.0 - 100.0 ng/mL Final    Literature Recommendations for 25(OH)D levels are:  Range           Vitamin D Status   <20    ng/mL      Deficiency   20-<30 ng/mL      Insufficiency    ng/mL      Sufficiency   >100   ng/mL      Toxicity    *Clinical controversy exists regarding optimal 25(OH)D levels. Emerging evidence links potential adverse effects to high levels, particularly >60 ng/mL.           ASSESSMENT   Imp:biliary  colic  PLan:cholecystectomy, laparoscopic possible open cholecystectomy  disc with pt the risks of bleeding infection pneumonia dvt/pe and common bile duct injury with a second major operation to repair        Meds & Refills for this Visit:  Requested Prescriptions      No prescriptions requested or ordered in this encounter       Imaging & Consults:  None         [1]   Current Outpatient Medications on File Prior to Visit   Medication Sig Dispense Refill    norethindrone 5 MG Oral Tab TAKE 1/2 TABLET(2.5 MG) BY MOUTH DAILY 45 tablet 2    buPROPion ER (WELLBUTRIN XL) 150 MG Oral Tablet 24 Hr Take 1 tablet (150 mg total) by mouth every morning. 30 tablet 3    OLANZapine 5 MG Oral Tab Take 1 tablet (5 mg total) by mouth nightly. 30 tablet 3    clonazePAM 0.5 MG Oral Tab Take 1 tablet (0.5 mg total) by mouth 2 (two) times daily. 60 tablet 5    polymyxin B-trimethoprim 65627-6.1 UNIT/ML-% Ophthalmic Solution Place 1 drop into both eyes every 6 (six) hours. 10 mL 0    metFORMIN 500 MG Oral Tab Take 1 tablet (500 mg total) by mouth 2 (two) times daily with meals. 60 tablet 2    Albuterol Sulfate 108 (90 Base) MCG/ACT Inhalation Aerosol Powder, Breath Activated Inhale 1 puff into the lungs as needed.      Naratriptan HCl 2.5 MG Oral Tab Take 1 tablet (2.5 mg total) by mouth as needed for  Migraine (Take every 4 hours for migraines - max 2 tablets/24 hours). 30 tablet 1     No current facility-administered medications on file prior to visit.

## 2024-10-30 DIAGNOSIS — G43.109 MIGRAINE WITH AURA AND WITHOUT STATUS MIGRAINOSUS, NOT INTRACTABLE: Chronic | ICD-10-CM

## 2024-10-30 RX ORDER — NARATRIPTAN 2.5 MG/1
2.5 TABLET ORAL AS NEEDED
Qty: 30 TABLET | Refills: 1 | Status: SHIPPED | OUTPATIENT
Start: 2024-10-30

## 2024-10-30 NOTE — TELEPHONE ENCOUNTER
Naratriptan HCl 2.5 MG Oral Tab         Sig: Take 1 tablet (2.5 mg total) by mouth as needed for Migraine (Take every 4 hours for migraines - max 2 tablets/24 hours).    Disp: 30 tablet    Refills: 1    Start: 10/30/2024    Class: Normal    For: Migraine with aura and without status migrainosus, not intractable    Last ordered: 1 year ago (5/16/2023) by Paula Irwin MD    Neurology Medications Bynibv89/30/2024 11:24 AM   Protocol Details In person appointment or virtual visit in the past 6 mos or appointment in next 3 mos      To be filled at: [id number] [place] [address] [facility] [address], [phone], [phone]          LOV: 08/13/2024  RTC: 3-6 months   Labs:n/a   Last filled:06/12/2024  Future Appointments   Date Time Provider Department Center   1/9/2025  9:20 AM Maria Luisa Rizo APRN [id number] [facility]   3/25/2025  2:00 PM Irina Dawson APRN [facility] [place]

## 2024-11-14 ENCOUNTER — PATIENT MESSAGE (OUTPATIENT)
Dept: INTERNAL MEDICINE CLINIC | Facility: CLINIC | Age: 44
End: 2024-11-14

## 2024-11-14 DIAGNOSIS — E66.811 OBESITY, CLASS I, BMI 30-34.9: Primary | ICD-10-CM

## 2024-11-20 NOTE — TELEPHONE ENCOUNTER
Paula Irwin MD       10/25/24  3:21 PM  Note      She should check with insurance first to see if Wegovy is covered, and also should check with her local pharmacy to see if they have it in stock.  Should keep her January appointment with the weight loss clinic as well        Rx pended

## 2024-11-25 ENCOUNTER — TELEPHONE (OUTPATIENT)
Dept: INTERNAL MEDICINE CLINIC | Facility: CLINIC | Age: 44
End: 2024-11-25

## 2024-11-25 NOTE — TELEPHONE ENCOUNTER
Approved    Prior authorization approved  Payer: [employer] [facility] Case ID: [id number]    [phone]  Note from payer: The case has been Approved from  [id number] to [id number]  Approval Details    Authorized from October 23, 2024 to November 22, 2025  Electronic appeal: Not supported

## 2024-11-25 NOTE — TELEPHONE ENCOUNTER
Approved    Prior authorization approved  Payer: [employer] [facility] Case ID: [id number]    [phone]  Note from payer: The case has been Approved from  [id number] to [id number]  Approval Details    Authorized from October 23, 2024 to November 22, 2025  Electronic appeal: Not supported

## 2024-11-26 DIAGNOSIS — R73.03 PREDIABETES: ICD-10-CM

## 2024-11-26 DIAGNOSIS — E66.01 MORBID OBESITY (HCC): ICD-10-CM

## 2024-11-26 NOTE — TELEPHONE ENCOUNTER
LOV: 8/13/2024 with Dr. Irwin  RTC: 3-6 months  Last Relevant Labs: 8/17/2024  Filled: 8/19/2024    #60 with 2 refills    Future Appointments   Date Time Provider Department Center   1/9/2025  9:20 AM Maria Luisa Rizo APRN [id number] [facility]   3/25/2025  2:00 PM Irina Dawson APRN [facility]   6/23/2025  7:40 AM Raissa Rolle APRN EMGWERIVERA [facility]

## 2024-11-29 ENCOUNTER — APPOINTMENT (OUTPATIENT)
Dept: ADMINISTRATIVE | Facility: HOSPITAL | Age: 44
End: 2024-11-29
Payer: COMMERCIAL

## 2024-12-07 ENCOUNTER — LABORATORY ENCOUNTER (OUTPATIENT)
Dept: LAB | Age: 44
End: 2024-12-07
Attending: SURGERY
Payer: COMMERCIAL

## 2024-12-07 DIAGNOSIS — Z01.818 PRE-OP TESTING: ICD-10-CM

## 2024-12-07 LAB
ALBUMIN SERPL-MCNC: 4.6 G/DL (ref 3.2–4.8)
ALBUMIN/GLOB SERPL: 1.4 {RATIO} (ref 1–2)
ALP LIVER SERPL-CCNC: 91 U/L
ALT SERPL-CCNC: 35 U/L
ANION GAP SERPL CALC-SCNC: 10 MMOL/L (ref 0–18)
AST SERPL-CCNC: 24 U/L (ref ?–34)
BILIRUB SERPL-MCNC: 0.3 MG/DL (ref 0.3–1.2)
BUN BLD-MCNC: 15 MG/DL (ref 9–23)
CALCIUM BLD-MCNC: 9.7 MG/DL (ref 8.7–10.4)
CHLORIDE SERPL-SCNC: 110 MMOL/L (ref 98–112)
CO2 SERPL-SCNC: 24 MMOL/L (ref 21–32)
CREAT BLD-MCNC: 1.01 MG/DL
EGFRCR SERPLBLD CKD-EPI 2021: 70 ML/MIN/1.73M2 (ref 60–?)
ERYTHROCYTE [DISTWIDTH] IN BLOOD BY AUTOMATED COUNT: 13.9 %
FASTING STATUS PATIENT QL REPORTED: NO
GLOBULIN PLAS-MCNC: 3.2 G/DL (ref 2–3.5)
GLUCOSE BLD-MCNC: 92 MG/DL (ref 70–99)
HCT VFR BLD AUTO: 40.1 %
HGB BLD-MCNC: 12.8 G/DL
MCH RBC QN AUTO: 27.9 PG (ref 26–34)
MCHC RBC AUTO-ENTMCNC: 31.9 G/DL (ref 31–37)
MCV RBC AUTO: 87.4 FL
OSMOLALITY SERPL CALC.SUM OF ELEC: 298 MOSM/KG (ref 275–295)
PLATELET # BLD AUTO: 392 10(3)UL (ref 150–450)
POTASSIUM SERPL-SCNC: 4 MMOL/L (ref 3.5–5.1)
PROT SERPL-MCNC: 7.8 G/DL (ref 5.7–8.2)
RBC # BLD AUTO: 4.59 X10(6)UL
SODIUM SERPL-SCNC: 144 MMOL/L (ref 136–145)
WBC # BLD AUTO: 9.1 X10(3) UL (ref 4–11)

## 2024-12-07 PROCEDURE — 80053 COMPREHEN METABOLIC PANEL: CPT

## 2024-12-07 PROCEDURE — 36415 COLL VENOUS BLD VENIPUNCTURE: CPT

## 2024-12-07 PROCEDURE — 85027 COMPLETE CBC AUTOMATED: CPT

## 2024-12-09 ENCOUNTER — HOSPITAL ENCOUNTER (OUTPATIENT)
Age: 44
Discharge: HOME | End: 2024-12-09
Attending: EMERGENCY MEDICINE
Payer: COMMERCIAL

## 2024-12-09 VITALS
WEIGHT: 217 LBS | BODY MASS INDEX: 39.93 KG/M2 | HEIGHT: 62 IN | RESPIRATION RATE: 22 BRPM | TEMPERATURE: 99 F | OXYGEN SATURATION: 96 % | HEART RATE: 89 BPM | DIASTOLIC BLOOD PRESSURE: 88 MMHG | SYSTOLIC BLOOD PRESSURE: 148 MMHG

## 2024-12-09 DIAGNOSIS — J02.9 VIRAL PHARYNGITIS: Primary | ICD-10-CM

## 2024-12-09 LAB — S PYO AG THROAT QL IA.RAPID: NEGATIVE

## 2024-12-09 PROCEDURE — 99212 OFFICE O/P EST SF 10 MIN: CPT

## 2024-12-09 PROCEDURE — 99213 OFFICE O/P EST LOW 20 MIN: CPT

## 2024-12-09 PROCEDURE — 87651 STREP A DNA AMP PROBE: CPT | Performed by: EMERGENCY MEDICINE

## 2024-12-09 NOTE — ED INITIAL ASSESSMENT (HPI)
Patient reports sore throat and right ear pain x 1 week.  Patient denies fevers or cough.  Reports difficulty swallowing due to the pain.

## 2024-12-09 NOTE — ED PROVIDER NOTES
Patient Seen in: [facility]      History     Chief Complaint   Patient presents with    Sore Throat    Ear Pain     Stated Complaint: Sore Throat/Ear Pain    Subjective:   HPI      45 yo with sore throat and right ear pain for about ten days. No fever. No cough. No significant URI symptoms. Painful to swallow.     Objective:     Past Medical History:    Abnormal uterine bleeding    Allergic rhinitis    Anxiety    Bipolar affective (HCC)    Calculus of kidney    Dense breasts    Heterogeneously dense    Depression    Dyspareunia    Endometriosis    surgically confirmed per patient report. No op note available.     Essential hypertension    Fibroids    Fibromyalgia    Galactorrhea    Mammogram & breast US with dilated retroareolar duct but otherwise normal. Prolactin normal 9/2020.     High cholesterol    History of blood transfusion    History of mammogram    Mammogram BILAT & US breast RIGHT - dense breasts, mildly dilated right retroareolar duct.     History of MRSA infection    History of pelvic ultrasound    6/8/22 Pelvic US - amenorrheic x 9 months on continuous progestin only OCP Slynd. Stable 1.5 cm subserosal left fundal fibroid. Small cysts bilateral ovaries (1.6 cm) consistent with follicles. Agree with previous visit suggestion to try NE 2.5 mg HS continuously to suppress ovulation better.     HPV vaccine counseling    Received HPV vaccine series.     Hyperlipidemia    Hypothyroidism    Infertility associated with anovulation    IVF    Migraine with aura    No estrogen    Migraines    Obesity    Pap smear for cervical cancer screening    Pap & HPV negative.     Post partum depression    Prediabetes    Right ovarian cyst    Pelvic US 8/7/2020 - CD12. Probable functional cyst in the right ovary 2.9 cm. Tiny 18 mm subserosal left sided fundal fibroid (likely incidental).   6/8/22 Pelvic US - ovaries with 1.6 cm simple cysts/follicles bilaterally.      Sleep apnea    CPAP    Subserosal  leiomyoma of uterus    Incidental 1.8 cm left fundal submucous fibroid.     Visual impairment    GLASSES    Vitamin D deficiency              Past Surgical History:   Procedure Laterality Date      2017      2007    Colonoscopy  2007    Laparoscopy,diagnostic  2016    Diagnosed with endometriosis. Stage 2 probably.                 Social History     Socioeconomic History    Marital status:    Occupational History    Occupation:    Tobacco Use    Smoking status: Never    Smokeless tobacco: Never   Vaping Use    Vaping status: Never Used   Substance and Sexual Activity    Alcohol use: Yes     Alcohol/week: 2.0 standard drinks of alcohol     Types: 2 Glasses of wine per week     Comment: Socially    Drug use: Never    Sexual activity: Yes     Partners: Male     Birth control/protection: OCP   Other Topics Concern    Caffeine Concern No    Stress Concern Yes    Weight Concern Yes    Special Diet No    Exercise No    Seat Belt No              Review of Systems    Positive for stated complaint: Sore Throat/Ear Pain  Other systems are as noted in HPI.  Constitutional and vital signs reviewed.      All other systems reviewed and negative except as noted above.    Physical Exam     ED Triage Vitals [24 1713]   /88   Pulse 89   Resp 22   Temp 98.6 °F (37 °C)   Temp src Oral   SpO2 96 %   O2 Device None (Room air)       Current Vitals:   Vital Signs  BP: 148/88  Pulse: 89  Resp: 22  Temp: 98.6 °F (37 °C)  Temp src: Oral    Oxygen Therapy  SpO2: 96 %  O2 Device: None (Room air)        Physical Exam  Vitals and nursing note reviewed.   Constitutional:       Appearance: Normal appearance. She is well-developed.   HENT:      Head: Normocephalic and atraumatic.      Right Ear: Tympanic membrane normal.      Left Ear: Tympanic membrane normal.      Mouth/Throat:      Mouth: Mucous membranes are moist.      Pharynx: Oropharynx is clear. Uvula midline. Posterior oropharyngeal erythema  present.      Tonsils: No tonsillar exudate or tonsillar abscesses.   Cardiovascular:      Rate and Rhythm: Normal rate and regular rhythm.   Pulmonary:      Effort: Pulmonary effort is normal. No respiratory distress.   Lymphadenopathy:      Cervical: No cervical adenopathy.   Skin:     General: Skin is warm and dry.      Capillary Refill: Capillary refill takes less than 2 seconds.   Neurological:      General: No focal deficit present.      Mental Status: She is alert.   Psychiatric:         Mood and Affect: Mood normal.         Behavior: Behavior normal.            ED Course     Labs Reviewed   RAPID STREP A - Normal                   MDM           Medical Decision Making  Strep vs viral pharyngitis. Rapid strep: negative. Discharge on otc ibuprofen for pain.     Disposition and Plan     Clinical Impression:  1. Viral pharyngitis         Disposition:  Discharge  12/9/2024  5:59 pm    Follow-up:  Paula Irwin MD  [address] [facility] [address]  [phone]      As needed          Medications Prescribed:  Current Discharge Medication List              Supplementary Documentation:

## 2024-12-16 DIAGNOSIS — E66.811 OBESITY, CLASS I, BMI 30-34.9: ICD-10-CM

## 2024-12-16 NOTE — TELEPHONE ENCOUNTER
LOV: 8/13/2024 with Dr. Irwin  RTC: 3-6 months  Last Relevant Labs: 12/7/2024  Filled: 11/20/2024    #2 mL with 0 refills    Future Appointments   Date Time Provider Department Center   1/9/2025  9:20 AM Maria Luisa Rizo APRN [id number] [facility]   3/25/2025  2:00 PM Irina Dawson APRN [id number] [facility]   6/23/2025  7:40 AM Raissa Rolle APRN [facility]IVERA [facility]

## 2024-12-23 ENCOUNTER — ANESTHESIA EVENT (OUTPATIENT)
Dept: SURGERY | Facility: HOSPITAL | Age: 44
End: 2024-12-23
Payer: COMMERCIAL

## 2024-12-23 ENCOUNTER — APPOINTMENT (OUTPATIENT)
Dept: GENERAL RADIOLOGY | Facility: HOSPITAL | Age: 44
End: 2024-12-23
Attending: SURGERY
Payer: COMMERCIAL

## 2024-12-23 ENCOUNTER — ANESTHESIA (OUTPATIENT)
Dept: SURGERY | Facility: HOSPITAL | Age: 44
End: 2024-12-23
Payer: COMMERCIAL

## 2024-12-23 ENCOUNTER — HOSPITAL ENCOUNTER (OUTPATIENT)
Facility: HOSPITAL | Age: 44
Setting detail: HOSPITAL OUTPATIENT SURGERY
Discharge: HOME | End: 2024-12-23
Attending: SURGERY | Admitting: SURGERY
Payer: COMMERCIAL

## 2024-12-23 ENCOUNTER — TELEPHONE (OUTPATIENT)
Dept: INTERNAL MEDICINE CLINIC | Facility: CLINIC | Age: 44
End: 2024-12-23

## 2024-12-23 VITALS
SYSTOLIC BLOOD PRESSURE: 114 MMHG | BODY MASS INDEX: 39.96 KG/M2 | WEIGHT: 214.38 LBS | HEIGHT: 61.5 IN | HEART RATE: 99 BPM | RESPIRATION RATE: 16 BRPM | TEMPERATURE: 98 F | DIASTOLIC BLOOD PRESSURE: 67 MMHG | OXYGEN SATURATION: 95 %

## 2024-12-23 DIAGNOSIS — E66.811 OBESITY, CLASS I, BMI 30-34.9: ICD-10-CM

## 2024-12-23 DIAGNOSIS — K80.10 CALCULUS OF GALLBLADDER WITH CHOLECYSTITIS WITHOUT BILIARY OBSTRUCTION, UNSPECIFIED CHOLECYSTITIS ACUITY: ICD-10-CM

## 2024-12-23 DIAGNOSIS — Z01.818 PRE-OP TESTING: Primary | ICD-10-CM

## 2024-12-23 DIAGNOSIS — G89.18 POSTOPERATIVE PAIN: ICD-10-CM

## 2024-12-23 LAB
GLUCOSE BLD-MCNC: 102 MG/DL (ref 70–99)
GLUCOSE BLD-MCNC: 160 MG/DL (ref 70–99)

## 2024-12-23 PROCEDURE — 82962 GLUCOSE BLOOD TEST: CPT

## 2024-12-23 PROCEDURE — BF101ZZ FLUOROSCOPY OF BILE DUCTS USING LOW OSMOLAR CONTRAST: ICD-10-PCS | Performed by: SURGERY

## 2024-12-23 PROCEDURE — 0FT44ZZ RESECTION OF GALLBLADDER, PERCUTANEOUS ENDOSCOPIC APPROACH: ICD-10-PCS | Performed by: SURGERY

## 2024-12-23 PROCEDURE — 74300 X-RAY BILE DUCTS/PANCREAS: CPT | Performed by: SURGERY

## 2024-12-23 RX ORDER — ACETAMINOPHEN 500 MG
1000 TABLET ORAL ONCE
Status: DISCONTINUED | OUTPATIENT
Start: 2024-12-23 | End: 2024-12-23 | Stop reason: HOSPADM

## 2024-12-23 RX ORDER — HEPARIN SODIUM 5000 [USP'U]/ML
INJECTION, SOLUTION INTRAVENOUS; SUBCUTANEOUS
Status: DISCONTINUED
Start: 2024-12-23 | End: 2024-12-23

## 2024-12-23 RX ORDER — DEXAMETHASONE SODIUM PHOSPHATE 4 MG/ML
VIAL (ML) INJECTION AS NEEDED
Status: DISCONTINUED | OUTPATIENT
Start: 2024-12-23 | End: 2024-12-23 | Stop reason: SURG

## 2024-12-23 RX ORDER — KETOROLAC TROMETHAMINE 30 MG/ML
INJECTION, SOLUTION INTRAMUSCULAR; INTRAVENOUS AS NEEDED
Status: DISCONTINUED | OUTPATIENT
Start: 2024-12-23 | End: 2024-12-23 | Stop reason: SURG

## 2024-12-23 RX ORDER — HYDROCODONE BITARTRATE AND ACETAMINOPHEN 5; 325 MG/1; MG/1
1 TABLET ORAL EVERY 6 HOURS PRN
Qty: 15 TABLET | Refills: 0 | Status: SHIPPED | OUTPATIENT
Start: 2024-12-23

## 2024-12-23 RX ORDER — MEPERIDINE HYDROCHLORIDE 25 MG/ML
12.5 INJECTION INTRAMUSCULAR; INTRAVENOUS; SUBCUTANEOUS
Status: DISCONTINUED | OUTPATIENT
Start: 2024-12-23 | End: 2024-12-23

## 2024-12-23 RX ORDER — HYDROMORPHONE HYDROCHLORIDE 1 MG/ML
0.2 INJECTION, SOLUTION INTRAMUSCULAR; INTRAVENOUS; SUBCUTANEOUS EVERY 5 MIN PRN
Status: DISCONTINUED | OUTPATIENT
Start: 2024-12-23 | End: 2024-12-23

## 2024-12-23 RX ORDER — HYDROCODONE BITARTRATE AND ACETAMINOPHEN 5; 325 MG/1; MG/1
1 TABLET ORAL ONCE AS NEEDED
Status: DISCONTINUED | OUTPATIENT
Start: 2024-12-23 | End: 2024-12-23

## 2024-12-23 RX ORDER — PROCHLORPERAZINE EDISYLATE 5 MG/ML
5 INJECTION INTRAMUSCULAR; INTRAVENOUS EVERY 8 HOURS PRN
Status: DISCONTINUED | OUTPATIENT
Start: 2024-12-23 | End: 2024-12-23

## 2024-12-23 RX ORDER — SCOLOPAMINE TRANSDERMAL SYSTEM 1 MG/1
1 PATCH, EXTENDED RELEASE TRANSDERMAL ONCE
Status: DISCONTINUED | OUTPATIENT
Start: 2024-12-23 | End: 2024-12-23 | Stop reason: HOSPADM

## 2024-12-23 RX ORDER — HYDROMORPHONE HYDROCHLORIDE 1 MG/ML
0.4 INJECTION, SOLUTION INTRAMUSCULAR; INTRAVENOUS; SUBCUTANEOUS EVERY 5 MIN PRN
Status: DISCONTINUED | OUTPATIENT
Start: 2024-12-23 | End: 2024-12-23

## 2024-12-23 RX ORDER — BUPIVACAINE HYDROCHLORIDE 5 MG/ML
INJECTION, SOLUTION EPIDURAL; INTRACAUDAL AS NEEDED
Status: DISCONTINUED | OUTPATIENT
Start: 2024-12-23 | End: 2024-12-23 | Stop reason: HOSPADM

## 2024-12-23 RX ORDER — ONDANSETRON 2 MG/ML
INJECTION INTRAMUSCULAR; INTRAVENOUS AS NEEDED
Status: DISCONTINUED | OUTPATIENT
Start: 2024-12-23 | End: 2024-12-23 | Stop reason: SURG

## 2024-12-23 RX ORDER — SODIUM CHLORIDE, SODIUM LACTATE, POTASSIUM CHLORIDE, CALCIUM CHLORIDE 600; 310; 30; 20 MG/100ML; MG/100ML; MG/100ML; MG/100ML
INJECTION, SOLUTION INTRAVENOUS CONTINUOUS
Status: DISCONTINUED | OUTPATIENT
Start: 2024-12-23 | End: 2024-12-23

## 2024-12-23 RX ORDER — LIDOCAINE HYDROCHLORIDE 10 MG/ML
INJECTION, SOLUTION EPIDURAL; INFILTRATION; INTRACAUDAL; PERINEURAL AS NEEDED
Status: DISCONTINUED | OUTPATIENT
Start: 2024-12-23 | End: 2024-12-23 | Stop reason: SURG

## 2024-12-23 RX ORDER — ACETAMINOPHEN 500 MG
1000 TABLET ORAL ONCE AS NEEDED
Status: DISCONTINUED | OUTPATIENT
Start: 2024-12-23 | End: 2024-12-23

## 2024-12-23 RX ORDER — ONDANSETRON 2 MG/ML
4 INJECTION INTRAMUSCULAR; INTRAVENOUS EVERY 6 HOURS PRN
Status: DISCONTINUED | OUTPATIENT
Start: 2024-12-23 | End: 2024-12-23

## 2024-12-23 RX ORDER — ROCURONIUM BROMIDE 10 MG/ML
INJECTION, SOLUTION INTRAVENOUS AS NEEDED
Status: DISCONTINUED | OUTPATIENT
Start: 2024-12-23 | End: 2024-12-23 | Stop reason: SURG

## 2024-12-23 RX ORDER — PHENYLEPHRINE HCL 10 MG/ML
VIAL (ML) INJECTION AS NEEDED
Status: DISCONTINUED | OUTPATIENT
Start: 2024-12-23 | End: 2024-12-23 | Stop reason: SURG

## 2024-12-23 RX ORDER — HYDROMORPHONE HYDROCHLORIDE 1 MG/ML
0.6 INJECTION, SOLUTION INTRAMUSCULAR; INTRAVENOUS; SUBCUTANEOUS EVERY 5 MIN PRN
Status: DISCONTINUED | OUTPATIENT
Start: 2024-12-23 | End: 2024-12-23

## 2024-12-23 RX ORDER — METOCLOPRAMIDE HYDROCHLORIDE 5 MG/ML
INJECTION INTRAMUSCULAR; INTRAVENOUS AS NEEDED
Status: DISCONTINUED | OUTPATIENT
Start: 2024-12-23 | End: 2024-12-23 | Stop reason: SURG

## 2024-12-23 RX ORDER — HEPARIN SODIUM 5000 [USP'U]/ML
5000 INJECTION, SOLUTION INTRAVENOUS; SUBCUTANEOUS ONCE
Status: COMPLETED | OUTPATIENT
Start: 2024-12-23 | End: 2024-12-23

## 2024-12-23 RX ORDER — HYDROCODONE BITARTRATE AND ACETAMINOPHEN 5; 325 MG/1; MG/1
2 TABLET ORAL ONCE AS NEEDED
Status: DISCONTINUED | OUTPATIENT
Start: 2024-12-23 | End: 2024-12-23

## 2024-12-23 RX ORDER — MIDAZOLAM HYDROCHLORIDE 1 MG/ML
1 INJECTION INTRAMUSCULAR; INTRAVENOUS EVERY 5 MIN PRN
Status: DISCONTINUED | OUTPATIENT
Start: 2024-12-23 | End: 2024-12-23

## 2024-12-23 RX ORDER — NALOXONE HYDROCHLORIDE 0.4 MG/ML
0.08 INJECTION, SOLUTION INTRAMUSCULAR; INTRAVENOUS; SUBCUTANEOUS AS NEEDED
Status: DISCONTINUED | OUTPATIENT
Start: 2024-12-23 | End: 2024-12-23

## 2024-12-23 RX ADMIN — KETOROLAC TROMETHAMINE 30 MG: 30 INJECTION, SOLUTION INTRAMUSCULAR; INTRAVENOUS at 09:05:00

## 2024-12-23 RX ADMIN — DEXAMETHASONE SODIUM PHOSPHATE 4 MG: 4 MG/ML VIAL (ML) INJECTION at 08:00:00

## 2024-12-23 RX ADMIN — METOCLOPRAMIDE HYDROCHLORIDE 10 MG: 5 INJECTION INTRAMUSCULAR; INTRAVENOUS at 08:00:00

## 2024-12-23 RX ADMIN — LIDOCAINE HYDROCHLORIDE 50 MG: 10 INJECTION, SOLUTION EPIDURAL; INFILTRATION; INTRACAUDAL; PERINEURAL at 07:56:00

## 2024-12-23 RX ADMIN — ONDANSETRON 4 MG: 2 INJECTION INTRAMUSCULAR; INTRAVENOUS at 08:10:00

## 2024-12-23 RX ADMIN — ROCURONIUM BROMIDE 50 MG: 10 INJECTION, SOLUTION INTRAVENOUS at 07:56:00

## 2024-12-23 RX ADMIN — SODIUM CHLORIDE, SODIUM LACTATE, POTASSIUM CHLORIDE, CALCIUM CHLORIDE: 600; 310; 30; 20 INJECTION, SOLUTION INTRAVENOUS at 08:14:00

## 2024-12-23 RX ADMIN — PHENYLEPHRINE HCL 100 MCG: 10 MG/ML VIAL (ML) INJECTION at 08:05:00

## 2024-12-23 RX ADMIN — SODIUM CHLORIDE, SODIUM LACTATE, POTASSIUM CHLORIDE, CALCIUM CHLORIDE: 600; 310; 30; 20 INJECTION, SOLUTION INTRAVENOUS at 09:28:00

## 2024-12-23 RX ADMIN — SODIUM CHLORIDE, SODIUM LACTATE, POTASSIUM CHLORIDE, CALCIUM CHLORIDE: 600; 310; 30; 20 INJECTION, SOLUTION INTRAVENOUS at 08:40:00

## 2024-12-23 RX ADMIN — PHENYLEPHRINE HCL 200 MCG: 10 MG/ML VIAL (ML) INJECTION at 08:08:00

## 2024-12-23 NOTE — ANESTHESIA POSTPROCEDURE EVALUATION
[facility]    Elidia Olmstead Patient Status:  Hospital Outpatient Surgery   Age/Gender 44 year old female MRN [id number]   Location [facility] POST ANESTHESIA CARE UNIT Attending Yoni Hurley DO   Hosp Day # 0 PCP Paula Irwin MD       Anesthesia Post-op Note    LAPAROSCOPIC CHOLECYSTECTOMY WITH CHOLANGIOGRAM (C-ARM)    Procedure Summary       Date: 12/23/24 Room / Location:  MAIN OR 11 /  MAIN OR    Anesthesia Start: 0750 Anesthesia Stop: 0931    Procedure: LAPAROSCOPIC CHOLECYSTECTOMY WITH CHOLANGIOGRAM (C-ARM) (Abdomen) Diagnosis:       Calculus of gallbladder with cholecystitis without biliary obstruction, unspecified cholecystitis acuity      (Calculus of gallbladder with cholecystitis without biliary obstruction, unspecified cholecystitis acuity [K80.10])    Surgeons: Yoni Hurley DO Anesthesiologist: Edi Alston MD    Anesthesia Type: general ASA Status: 2            Anesthesia Type: GA    Vitals Value Taken Time   /75 12/23/24 0930   Temp 36.5 12/23/24 0932   Pulse 94 12/23/24 0932   Resp 14 12/23/24 0932   SpO2 100 % 12/23/24 0932   Vitals shown include unfiled device data.    Patient Location: PACU    Anesthesia Type: general    Airway Patency: patent    Postop Pain Control: adequate    Mental Status: preanesthetic baseline    Nausea/Vomiting: none    Cardiopulmonary/Hydration status: stable euvolemic    Complications: no apparent anesthesia related complications    Postop vital signs: stable    Dental Exam: Unchanged from Preop    Patient to be discharged from PACU when criteria met.

## 2024-12-23 NOTE — OPERATIVE REPORT
[facility]  Operative Note     Elidia Olmstead Location: OR   CSN [id number] MRN [id number]   Admission Date 12/23/2024 Operation Date 12/23/2024   Attending Physician Yoni Hurley DO Operating Physician Yoni Hurley DO      Preoperative Diagnosis: Chronic cholecystitis with cholelithiasis     Postoperative Diagnosis: Same     Procedure Performed:   LAPAROSCOPIC CHOLECYSTECTOMY WITH CHOLANGIOGRAM (C-ARM)      Primary Surgeon: Yoni Hurley DO      Assistant: Loyd DIA     Surgical Findings: Omental adhesions to surface of gallbladder     Anesthesia: General     Complications: None     Implants: * No implants in log *     Specimen: Gallbladder     Drains: None     Condition: Good     Estimated Blood Loss: 10 cc     Summary of Case: To recovery room stable     Operative note  Informed consent was previously obtained the patient.  Patient was taken to the operative suite and placed in supine position.  General inhalational anesthetic was administered by the anesthesia department.  The anterior abdomen was prepped and draped in usual sterile fashion.  The abdomen was entered  through a subumbilical incision using standard has Kat technique.  Laparoscopy was carried out at  the abdominal entry site demonstrating no evidence of injury to intra-abdominal contents.  #5 trochars were placed at the subxiphoid position,  right subcostal midclavicular and axillary line under laparoscopic visualization.  Right and left lobes of the liver were found to be of normal color contour size and shape.  The gallbladder was grasped at its fundus and retracted cephalad and medially.  There was some omental adhesions to the gallbladder these adhesions were lysed sharply.  The  infundibulum was grasped and retracted laterally.  Blunt and sharp dissection was carried out within the [place] until the cystic duct and cystic artery were isolated.  A single clip was placed on the proximal cystic duct a small opening made in  the cystic duct.  C-arm cholangiography was carried out using full-strength Hypaque solution.  Initially a guidewire was passed into the cystic duct followed by a ureteral catheter.  This was followed by C-arm cholangiography demonstrating normal intra-and extrahepatic biliary ductal anatomy with easy flow of contrast into the duodenum.  Cholangiography was terminated and the cholangiocatheter was removed.  3 clips were placed on the proximal cystic duct and it was divided. The cystic artery was clipped twice proximally once distally and it was divided.  The gallbladder was then bluntly and sharply dissected off the gallbladder fossa using hand held electrocautery.  The gallbladder was placed in an Endo Catch bag and brought out through the umbilical port intact.  Insufflation was resumed and irrigation was carried out of the right upper quadrant.  The gallbladder fossa was copiously irrigated and the clips were found to be in good position.  All fluid was suctioned and trochars were removed under reduced pneumoperitoneum demonstrated no evidence of bleeding in the right upper quadrant or the gallbladder fossa.  The umbilicus was approximated at  the fascial level using running 2-0 Vicryl suture.  Skin edges were approximated using 4-0 Monocryl in a running subdermal fashion.  20 cc of 0.25% Marcaine plain was injected into all incisions at the completion of the procedure.  Sterile dressings were applied.  The patient was transported from the operative suite to recovery room in stable condition all sponge needle counts were correct.    Yoni Hurley DO  12/23/2024  9:23 AM

## 2024-12-23 NOTE — ANESTHESIA PROCEDURE NOTES
Airway  Date/Time: 12/23/2024 7:58 AM  Urgency: elective      General Information and Staff    Patient location during procedure: OR  Anesthesiologist: Edi Alston MD  Resident/CRNA: Maria Luisa Valera CRNA  Performed: CRNA   Performed by: Maria Luisa Valera CRNA  Authorized by: Edi Alston MD      Indications and Patient Condition  Indications for airway management: anesthesia  Sedation level: deep  Preoxygenated: yes  Patient position: sniffing  Mask difficulty assessment: 2 - vent by mask + OA or adjuvant +/- NMBA    Final Airway Details  Final airway type: endotracheal airway      Successful airway: ETT  Cuffed: yes   Successful intubation technique: direct laryngoscopy  Endotracheal tube insertion site: oral  Blade: GlideScope  Blade size: #3  ETT size (mm): 7.0    Cormack-Lehane Classification: grade I - full view of glottis  Placement verified by: capnometry   Measured from: lips  ETT to lips (cm): 22  Number of attempts at approach: 1    Additional Comments  Lips, dentition, oral mucosa as per preop

## 2024-12-23 NOTE — ANESTHESIA PREPROCEDURE EVALUATION
PRE-OP EVALUATION    Patient Name: Elidia Olmstead    Admit Diagnosis: [place] of gallbladder with cholecystitis without biliary obstruction, unspecified cholecystitis acuity [K80.10]    Pre-op Diagnosis: [place] of gallbladder with cholecystitis without biliary obstruction, unspecified cholecystitis acuity [K80.10]    LAPAROSCOPIC CHOLECYSTECTOMY POSSIBLE OPEN WITH CHOLANGIOGRAM (C-ARM)    Anesthesia Procedure: LAPAROSCOPIC CHOLECYSTECTOMY POSSIBLE OPEN WITH CHOLANGIOGRAM (C-ARM)    Surgeons and Role:     * Yoni Hurley, DO - Primary    Pre-op vitals reviewed.  Temp: 97 °F (36.1 °C)  Pulse: 87  Resp: 16  BP: 113/76  SpO2: 96 %  Body mass index is 39.21 kg/m².    Current medications reviewed.  Hospital Medications:   acetaminophen (Tylenol Extra Strength) tab 1,000 mg  1,000 mg Oral Once    scopolamine (Transderm-Scop) 1 MG/3DAYS patch 1 patch  1 patch Transdermal Once    lactated ringers infusion   Intravenous Continuous    [COMPLETED] heparin (Porcine) 5000 UNIT/ML injection 5,000 Units  5,000 Units Subcutaneous Once    ceFAZolin (Ancef) 2g in 10mL IV syringe premix  2 g Intravenous Once    heparin (Porcine) 5000 UNIT/ML injection        ceFAZolin (Ancef) 2 g/10mL IV syringe premix           Outpatient Medications:   Prescriptions Prior to Admission[1]    Allergies: Other and Seasonal      Anesthesia Evaluation    Patient summary reviewed.    Anesthetic Complications  (-) history of anesthetic complications         GI/Hepatic/Renal    Negative GI/hepatic/renal ROS.                             Cardiovascular    Negative cardiovascular ROS.    Exercise tolerance: good     MET: >4    (+) obesity  (+) hypertension                                     Endo/Other           (+) hypothyroidism                       Pulmonary    Negative pulmonary ROS.                (+) sleep apnea       Neuro/Psych  Comment: Bipolar affective disorder    (+) depression             (+) headaches                 Past Surgical History:    Procedure Laterality Date                Colonoscopy      Laparoscopy,diagnostic  2016    Diagnosed with endometriosis. Stage 2 probably.      Social History     Socioeconomic History    Marital status:    Occupational History    Occupation:    Tobacco Use    Smoking status: Never    Smokeless tobacco: Never   Vaping Use    Vaping status: Never Used   Substance and Sexual Activity    Alcohol use: Yes     Alcohol/week: 2.0 standard drinks of alcohol     Types: 2 Glasses of wine per week     Comment: Socially    Drug use: Never    Sexual activity: Yes     Partners: Male     Birth control/protection: OCP   Other Topics Concern    Caffeine Concern No    Stress Concern Yes    Weight Concern Yes    Special Diet No    Exercise No    Seat Belt No     History   Drug Use Unknown     Available pre-op labs reviewed.  Lab Results   Component Value Date    WBC 9.1 2024    RBC 4.59 2024    HGB 12.8 2024    HCT 40.1 2024    MCV 87.4 2024    MCH 27.9 2024    MCHC 31.9 2024    RDW 13.9 2024    .0 2024     Lab Results   Component Value Date     2024    K 4.0 2024     2024    CO2 24.0 2024    BUN 15 2024    CREATSERUM 1.01 2024    GLU 92 2024    CA 9.7 2024            Airway      Mallampati: III  Mouth opening: 3 FB  TM distance: > 6 cm  Neck ROM: full Cardiovascular    Cardiovascular exam normal.  Rhythm: regular  Rate: normal  (-) murmur   Dental    Dentition appears grossly intact         Pulmonary    Pulmonary exam normal.  Breath sounds clear to auscultation bilaterally.               Other findings              ASA: 2   Plan: general  NPO status verified and patient meets guidelines.        Comment: GETA discussed in detail.  Risk of sore throat, cough, dental trauma, PONV discussed.  Patient expresses understanding and wishes to proceed. All questions  answered.    Plan/risks discussed with: patient and spouse                Present on Admission:  **None**           [1]   Medications Prior to Admission   Medication Sig Dispense Refill Last Dose/Taking    semaglutide-weight management 0.25 MG/0.5ML Subcutaneous Solution Auto-injector Inject 0.5 mL (0.25 mg total) into the skin once a week. 2 mL 0 Past Month    metFORMIN 500 MG Oral Tab Take 1 tablet (500 mg total) by mouth 2 (two) times daily with meals. 60 tablet 2 12/21/2024    Naratriptan HCl 2.5 MG Oral Tab Take 1 tablet (2.5 mg total) by mouth as needed for Migraine (Take every 4 hours for migraines - max 2 tablets/24 hours). 30 tablet 1 Past Month    norethindrone 5 MG Oral Tab TAKE 1/2 TABLET(2.5 MG) BY MOUTH DAILY 45 tablet 2 12/22/2024    buPROPion ER (WELLBUTRIN XL) 150 MG Oral Tablet 24 Hr Take 1 tablet (150 mg total) by mouth every morning. 30 tablet 3 12/22/2024    OLANZapine 5 MG Oral Tab Take 1 tablet (5 mg total) by mouth nightly. 30 tablet 3 12/22/2024    clonazePAM 0.5 MG Oral Tab Take 1 tablet (0.5 mg total) by mouth 2 (two) times daily. 60 tablet 5 12/22/2024

## 2024-12-23 NOTE — H&P
Patient presents with  ruq pain for three months assoc with nausea following eating greasy food patient states the pain is 8 out of 10 and is in the epigastric and right upper quadrant area radiating right subcostal later this is associated with belching and bloating.  Patient has had multiple attacks.  She states the pain lasts for a few hours to a half of a day and then resolves.  She denies change in bowel habits         .               Past Medical History       Past Medical History:    Abnormal uterine bleeding    Allergic rhinitis    Anxiety    Dense breasts     Heterogeneously dense    Depression    Dyspareunia    Endometriosis     surgically confirmed per patient report. No op note available.     Essential hypertension    Fibroids    Fibromyalgia    Galactorrhea     Mammogram & breast US with dilated retroareolar duct but otherwise normal. Prolactin normal 9/2020.     History of mammogram     Mammogram BILAT & US breast RIGHT - dense breasts, mildly dilated right retroareolar duct.     History of MRSA infection    History of pelvic ultrasound     6/8/22 Pelvic US - amenorrheic x 9 months on continuous progestin only OCP Slynd. Stable 1.5 cm subserosal left fundal fibroid. Small cysts bilateral ovaries (1.6 cm) consistent with follicles. Agree with previous visit suggestion to try NE 2.5 mg HS continuously to suppress ovulation better.     HPV vaccine counseling     Received HPV vaccine series.     Hyperlipidemia    Hypothyroidism    Infertility associated with anovulation     IVF    Migraine with aura     No estrogen    Obesity    Pap smear for cervical cancer screening     Pap & HPV negative.     Post partum depression    Right ovarian cyst     Pelvic US 8/7/2020 - CD12. Probable functional cyst in the right ovary 2.9 cm. Tiny 18 mm subserosal left sided fundal fibroid (likely incidental).   6/8/22 Pelvic US - ovaries with 1.6 cm simple cysts/follicles bilaterally.      Subserosal leiomyoma of uterus      Incidental 1.8 cm left fundal submucous fibroid.     Vitamin D deficiency         Past Surgical History         Past Surgical History:   Procedure Laterality Date                  Colonoscopy       Laparoscopy,diagnostic   2016     Diagnosed with endometriosis. Stage 2 probably.          [Medications Ordered Prior to Encounter]    [Medications Ordered Prior to Encounter]         Current Outpatient Medications on File Prior to Visit   Medication Sig Dispense Refill    norethindrone 5 MG Oral Tab TAKE 1/2 TABLET(2.5 MG) BY MOUTH DAILY 45 tablet 2    buPROPion ER (WELLBUTRIN XL) 150 MG Oral Tablet 24 Hr Take 1 tablet (150 mg total) by mouth every morning. 30 tablet 3    OLANZapine 5 MG Oral Tab Take 1 tablet (5 mg total) by mouth nightly. 30 tablet 3    clonazePAM 0.5 MG Oral Tab Take 1 tablet (0.5 mg total) by mouth 2 (two) times daily. 60 tablet 5    polymyxin B-trimethoprim 41131-2.1 UNIT/ML-% Ophthalmic Solution Place 1 drop into both eyes every 6 (six) hours. 10 mL 0    metFORMIN 500 MG Oral Tab Take 1 tablet (500 mg total) by mouth 2 (two) times daily with meals. 60 tablet 2    Albuterol Sulfate 108 (90 Base) MCG/ACT Inhalation Aerosol Powder, Breath Activated Inhale 1 puff into the lungs as needed.        Naratriptan HCl 2.5 MG Oral Tab Take 1 tablet (2.5 mg total) by mouth as needed for Migraine (Take every 4 hours for migraines - max 2 tablets/24 hours). 30 tablet 1      No current facility-administered medications on file prior to visit.     @ALL@  Family History         Family History   Problem Relation Age of Onset    Breast Cancer Mother 40    Hypertension Mother      Asthma Mother      Cancer Father 72         LEUKEMIA    No Known Problems Son      No Known Problems Son      No Known Problems Maternal Grandmother      No Known Problems Maternal Grandfather      Diabetes Paternal Grandmother      Heart Disorder Paternal Grandmother      No Known Problems Paternal Grandfather            Short Social Hx on File   Social History            Socioeconomic History    Marital status:    Occupational History    Occupation:    Tobacco Use    Smoking status: Never    Smokeless tobacco: Never   Vaping Use    Vaping status: Never Used   Substance and Sexual Activity    Alcohol use: Yes       Alcohol/week: 2.0 standard drinks of alcohol       Types: 2 Glasses of wine per week       Comment: Socially    Drug use: Never    Sexual activity: Yes       Partners: Male       Birth control/protection: OCP   Other Topics Concern    Caffeine Concern No    Stress Concern Yes    Weight Concern Yes    Special Diet No    Exercise No    Seat Belt No            ROS      GENERAL HEALTH: otherwise feels well, no weight loss, no fever or chills  SKIN: denies any unusual skin rashes or jaundice  HEENT: denies nasal congestion, sinus pain or sore throat; hearing loss negative,   EYES , no diplopia or vision changes  RESPIRATORY: denies shortness of breath, wheezing or cough   CARDIOVASCULAR: denies chest pain or NOBLE; no palpitations   GI: denies nausea, vomiting, constipation, diarrhea; no rectal bleeding; no heartburn  GENITAL/: no dysuria, urgency or frequency, no tea colored urine  MUSCULOSKELETAL: no joint complaints upper or lower extremities  HEMATOLOGY: denies hx anemia; denies bruising or excessive bleeding  Endocrine: no weight gain or loss no hot or cold intolerance     EXAM      Blood pressure (!) 134/93, pulse 91, temperature 98.3 °F (36.8 °C), temperature source Temporal, resp. rate 20, SpO2 99%, not currently breastfeeding.  GENERAL: well developed, well nourished female, in no apparent distress.    MENTAL STATUS :Alert, oriented x 3  PSYCH: normal mood and affect  SKIN: anicteric, no rashes, no bruising  EYES: PERRLA, EOMI, sclera anicteric,  conjunctiva without pallor  HEENT: normocephalic, atraumatic, TMs clear, nares patent, mouth moist, pharynx w/o erythema  NECK: supple, no JVD, no  adenopathy, no thyromegaly  RESPIRATORY: clear to auscultation, no rales , rhonchi or wheezing  CARDIOVASCULAR: RRR, murmur negative  ABDOMEN: normal active BS, soft, nondistended  no HSM, no masses or hernias  LYMPHATIC: no axillary , supraclavicular or inguinal lymphadenopathy  EXTREMITIES: no cyanosis, clubbing or edema, no atrophy, full ROM     STUDIES:              [facility] Lab Encounter on 08/17/2024   Component Date Value Ref Range Status    WBC 08/17/2024 9.5  4.0 - 11.0 x10(3) uL Final    RBC 08/17/2024 4.63  3.80 - 5.30 x10(6)uL Final    HGB 08/17/2024 13.0  12.0 - 16.0 g/dL Final    HCT 08/17/2024 40.2  35.0 - 48.0 % Final    PLT 08/17/2024 421.0  150.0 - 450.0 10(3)uL Final    MCV 08/17/2024 86.8  80.0 - 100.0 fL Final    MCH 08/17/2024 28.1  26.0 - 34.0 pg Final    MCHC 08/17/2024 32.3  31.0 - 37.0 g/dL Final    RDW 08/17/2024 14.0  % Final    Neutrophil Absolute Prelim 08/17/2024 5.77  1.50 - 7.70 x10 (3) uL Final    Neutrophil Absolute 08/17/2024 5.77  1.50 - 7.70 x10(3) uL Final    Lymphocyte Absolute 08/17/2024 2.88  1.00 - 4.00 x10(3) uL Final    Monocyte Absolute 08/17/2024 0.60  0.10 - 1.00 x10(3) uL Final    Eosinophil Absolute 08/17/2024 0.10  0.00 - 0.70 x10(3) uL Final    Basophil Absolute 08/17/2024 0.07  0.00 - 0.20 x10(3) uL Final    Immature Granulocyte Absolute 08/17/2024 0.04  0.00 - 1.00 x10(3) uL Final    Neutrophil % 08/17/2024 61.1  % Final    Lymphocyte % 08/17/2024 30.4  % Final    Monocyte % 08/17/2024 6.3  % Final    Eosinophil % 08/17/2024 1.1  % Final    Basophil % 08/17/2024 0.7  % Final    Immature Granulocyte % 08/17/2024 0.4  % Final    Glucose 08/17/2024 96  70 - 99 mg/dL Final    Sodium 08/17/2024 141  136 - 145 mmol/L Final    Potassium 08/17/2024 4.1  3.5 - 5.1 mmol/L Final    Chloride 08/17/2024 111  98 - 112 mmol/L Final    CO2 08/17/2024 22.0  21.0 - 32.0 mmol/L Final    Anion Gap 08/17/2024 8  0 - 18 mmol/L Final    BUN 08/17/2024 16  9 - 23 mg/dL Final    Creatinine  08/17/2024 1.07 (H)  0.55 - 1.02 mg/dL Final    Calcium, Total 08/17/2024 9.9  8.7 - 10.4 mg/dL Final    Calculated Osmolality 08/17/2024 293  275 - 295 mOsm/kg Final    eGFR-Cr 08/17/2024 66  >=60 mL/min/1.73m2 Final    AST 08/17/2024 16  <34 U/L Final    ALT 08/17/2024 20  10 - 49 U/L Final    Alkaline Phosphatase 08/17/2024 85  37 - 98 U/L Final    Bilirubin, Total 08/17/2024 0.3  0.3 - 1.2 mg/dL Final    Total Protein 08/17/2024 8.1  5.7 - 8.2 g/dL Final    Albumin 08/17/2024 4.8  3.2 - 4.8 g/dL Final    Globulin  08/17/2024 3.3  2.0 - 3.5 g/dL Final    A/G Ratio 08/17/2024 1.5  1.0 - 2.0 Final    Patient Fasting for CMP? 08/17/2024 Yes    Final    HgbA1C 08/17/2024 5.8 (H)  <5.7 % Final      Normal HbA1C:     <5.7%                                        Pre-Diabetic:     5.7 - 6.4%                                      Diabetic:         >6.4%                                  Diabetic Control: <7.0%          Estimated Average Glucose 08/17/2024 120  68 - 126 mg/dL Final     eAG is the estimated average glucose calculated from Hgb A1c according to the formula recommended by the American Diabetes Association. eAG levels reflect the long term average glucose and may not correlate with random or fasting glucose levels since these represent specific points in time.                                                            Cholesterol, Total 08/17/2024 208 (H)  <200 mg/dL Final     Desirable  <200 mg/dL  Borderline  200-239 mg/dL  High      >=240 mg/dL          HDL Cholesterol 08/17/2024 43  40 - 59 mg/dL Final     Interpretive Information:   An HDL cholesterol <40 mg/dL is low and constitutes a coronary heart disease risk factor. An HDL cholesterol >60 mg/dL is a negative risk factor for coronary heart disease.          Triglycerides 08/17/2024 105  30 - 149 mg/dL Final     Reference interval for fasting triglycerides  Desirable: <150 mg/dL  Borderline: 150-199 mg/dL  High: 200-499 mg/dL  Very High: >=500 mg/dL              LDL Cholesterol 08/17/2024 146 (H)  <100 mg/dL Final     Optimal            <100 mg/dL   Near/Above OptimaL 100-129 mg/dL   Borderline High    130-159 mg/dL   High               160-189 mg/dL    Very High          >190 mg/dL           VLDL 08/17/2024 20  0 - 30 mg/dL Final    Non HDL Chol 08/17/2024 165 (H)  <130 mg/dL Final     Desirable  <130 mg/dL   Borderline  130-159 mg/dL   High        160-189 mg/dL       Very high >=190 mg/dL          Patient Fasting for Lipid? 08/17/2024 Yes    Final    TSH 08/17/2024 2.348  0.550 - 4.780 mIU/mL Final    Vitamin B12 08/17/2024 377  211 - 911 pg/mL Final     Vitamin B12 Reference Range   Deficient:      <150 pg/mL   Indeterminate   150 - 211 pg/mL  Normal:         211 - 911 pg/mL        Folate (Folic Acid) 08/17/2024 >24.0  >5.4 ng/mL Final     This test may exhibit interference when a sample is collected from a person who is consuming high dose of biotin (a.k.a., vitamin B7, vitamin H, coenzyme R) supplements resulting in serum concentrations >50 ng/mL.  Intake of the recommended daily allowance (RDA) for biotin (0.03 mg) has not been shown to typically cause significant interference; however, high dose daily dietary supplements may contain biotin concentrations greater than 150 times (5-10 mg) the RDA.  It is recommended that physicians ask all patients who may be on biotin supplementation to stop biotin consumption at least 72 hours prior to collection of a new sample.       Vitamin D, 25OH, Total 08/17/2024 34.8  30.0 - 100.0 ng/mL Final     Literature Recommendations for 25(OH)D levels are:  Range           Vitamin D Status   <20    ng/mL      Deficiency   20-<30 ng/mL      Insufficiency    ng/mL      Sufficiency   >100   ng/mL      Toxicity     *Clinical controversy exists regarding optimal 25(OH)D levels. Emerging evidence links potential adverse effects to high levels, particularly >60 ng/mL.               ASSESSMENT   Imp:biliary   colic  PLan:cholecystectomy, laparoscopic possible open cholecystectomy  disc with pt the risks of bleeding infection pneumonia dvt/pe and common bile duct injury with a second major operation to repair

## 2024-12-23 NOTE — DISCHARGE INSTRUCTIONS
Home Care Instructions  Cholecystectomy  Dr. Yoni Hurley    MEDICATIONS  For post-operative pain control the medications are usually Norco or Tylenol #3.  These are narcotics and are best taken by starting with one tablet and repeating every four to six hours as needed.  If the patient does not feel they need the narcotics they shouldn’t take them.  If the pain is severe the patient may take up to two pills every four hours.  If a minor supplement is necessary in addition to the narcotics do not overlap with Tylenol (any product with acetaminophen) as both Norco and Tylenol #3 contain Tylenol.  Please supplement with Advil (ibuprofen) or Motrin.  Next Motrin dose on or after 3:05pm. Please ask your surgeon before resuming blood thinners such as aspirin, Plavix or Coumadin.  Do not simultaneously take your Clonazepam while taking Norco. All other home medications may be resumed as scheduled.  With severe cholecystitis, antibiotics will also be prescribed.  With antibiotics, please watch for rash, facial swelling or severe diarrhea.    DIET  The patient may resume a general diet immediately.  This is not a good time to eat excessively.  The patient should eat in moderation and stick with foods the patient feels are easy to digest.  Avoid high fat foods in the immediate post-operative time period as this may still cause some problems.  The patient may eat anything in small amounts but foods rich in dairy products, fatty foods or fried foods may cause an upset stomach for up to six weeks after surgery.  There should be no alcohol consumption in the immediate recovery time period or within six hours of taking narcotics.    WOUND CARE  The top dressing may be removed the day after surgery.  This includes the gauze, tape and band-aids if they are present.  Do not remove the steri-strips or butterfly tapes that are white and adherent to the skin.  The steri-strips will eventually peel up at the ends and at this point they  may be removed.  This is usually seven to ten days after surgery.  The patient may shower the day after surgery.  There is no need to cover the incisions, and all top gauze type dressing should be removed prior to showering.  Soap can get on the wounds but do not scrub over the wounds.  No hair dye or chemicals of any kind should get in the wounds.  Avoid tub baths, swimming or sitting in a hot tub for two weeks.  If visible sutures or staples are present they will be removed in the office by the surgeon or nurse.  Most wounds will be closed with dissolving suture underneath the skin.  These sutures will dissolve on their own.  If a drain is present make sure the patient receives drain care instructions from the nurse prior to discharge.  Most patients will not have a drain.    ACTIVITY  Every day the patient should be up, showered and dressed.  Each day the patient should be up and around the house.  The patient should not lie in bed and should not stay in pajamas.  We count on the patient being up, coughing, walking and deep breathing to avoid pneumonia and blood clots in the legs.  Once a day the patient should get out of the house and go shopping, go to the mall, the [employer] store, the [employer] or a restaurant.  The patient may ride in a car but should not drive the car for at least one week.  Patients should be off narcotics for at least 8 hours prior to being a .  The average time off work is 10 to 14 days; most adults will be seeing the surgeon prior to returning to work.  Students will return to school within 1-5 days after discharge from the hospital but will be off gym, sports, and indoors for recess for one month.  Patients may go up and down stairs and lift up to five pounds but no bending, pushing or pulling.  Nothing called work or exercise until the follow up visit.  No ‘stair-master’, power walking, jogging or workouts until the follow up visit.  Patients should seek further activity limits at  the time of their appointment.    APPOINTMENT  Please call our office for an appointment within five to ten days of discharge.  Any fever greater than 100.5, chills, nausea, vomiting, or severe diarrhea please call our office.  If the wound turns red, hot, swollen, becomes increasingly painful, or drains pus call us immediately at [phone].  For life threatening emergencies call 911.  For non-emergent care please call our office after 8:30 a.m. Monday through Friday.  The number listed above is our office number; our phone automatically switches to our answering service if we are not there.  Please do not use the emergency room for non-urgent care.    Thank you for entrusting us with your care.  [facility]--General Surgery

## 2024-12-24 NOTE — TELEPHONE ENCOUNTER
Called pharmacy   This prescription is on back order   Patient would need to call around to inquire which pharmacy has the prescription in stock     MCM sent to patient

## 2024-12-24 NOTE — TELEPHONE ENCOUNTER
Can we have an MA call the pharmacy to see if we can have this refilled? PA was approved. See previous TE   Refills have been requested for the following medications:         semaglutide-weight management 0.25 MG/0.5ML Subcutaneous Solution Auto-injector [Paula Irwin]      Patient Comment: Cab you please be so kind and approved the [facility] pharmacy said not approved by the dr. I truly appreciate it in advance and Elke Herrera      Preferred pharmacy: [facility] #[id number] [place], [place] - [address] AT [facility] [address], [phone], [phone]

## 2025-01-06 ENCOUNTER — OFFICE VISIT (OUTPATIENT)
Facility: LOCATION | Age: 45
End: 2025-01-06
Payer: COMMERCIAL

## 2025-01-06 VITALS
BODY MASS INDEX: 39.89 KG/M2 | HEIGHT: 61.5 IN | WEIGHT: 214 LBS | HEART RATE: 95 BPM | DIASTOLIC BLOOD PRESSURE: 79 MMHG | TEMPERATURE: 98 F | OXYGEN SATURATION: 98 % | SYSTOLIC BLOOD PRESSURE: 134 MMHG

## 2025-01-06 DIAGNOSIS — Z98.890 POST-OPERATIVE STATE: Primary | ICD-10-CM

## 2025-01-06 NOTE — PROGRESS NOTES
Post Operative Visit Note       Active Problems  1. Post-operative state         Chief Complaint   Chief Complaint   Patient presents with    Post-Op     PO - 12/23 w/ rwm - LAPAROSCOPIC CHOLECYSTECTOMY WITH CHOLANGIOGRAM (C-ARM), pain on right side of abdomen, nausea after eating, no other symptoms.          History of Present Illness   44 year old female who presents today for postoperative visit following laparoscopic cholecystectomy on 12/23/2024 by Dr. Hurley.    The patient reports intermittent episodes of nausea since her surgery.  She reports vomiting several days ago.  She denies current nausea or vomiting.  She reports incisional pain.  She denies fever or chills.  She denies diarrhea or constipation.        Allergies  Elidia is allergic to other and seasonal.    Past Medical / Surgical / Social / Family History    The past medical and past surgical history have been reviewed by me today.     Past Medical History:    Abnormal uterine bleeding    Allergic rhinitis    Anxiety    Bipolar affective (HCC)    Calculus of kidney    Dense breasts    Heterogeneously dense    Depression    Dyspareunia    Endometriosis    surgically confirmed per patient report. No op note available.     Essential hypertension    Fibroids    Fibromyalgia    Galactorrhea    Mammogram & breast US with dilated retroareolar duct but otherwise normal. Prolactin normal 9/2020.     High cholesterol    History of blood transfusion    History of mammogram    Mammogram BILAT & US breast RIGHT - dense breasts, mildly dilated right retroareolar duct.     History of MRSA infection    History of pelvic ultrasound    6/8/22 Pelvic US - amenorrheic x 9 months on continuous progestin only OCP Slynd. Stable 1.5 cm subserosal left fundal fibroid. Small cysts bilateral ovaries (1.6 cm) consistent with follicles. Agree with previous visit suggestion to try NE 2.5 mg HS continuously to suppress ovulation better.     HPV vaccine counseling    Received HPV  vaccine series.     Hyperlipidemia    Hypothyroidism    Infertility associated with anovulation    IVF    Migraine with aura    No estrogen    Migraines    Obesity    Pap smear for cervical cancer screening    Pap & HPV negative.     Post partum depression    Prediabetes    Right ovarian cyst    Pelvic US 2020 - CD12. Probable functional cyst in the right ovary 2.9 cm. Tiny 18 mm subserosal left sided fundal fibroid (likely incidental).   22 Pelvic US - ovaries with 1.6 cm simple cysts/follicles bilaterally.      Sleep apnea    CPAP    Subserosal leiomyoma of uterus    Incidental 1.8 cm left fundal submucous fibroid.     Type 1 diabetes mellitus (HCC)    Visual impairment    GLASSES    Vitamin D deficiency     Past Surgical History:   Procedure Laterality Date                Colonoscopy      Laparoscopy,diagnostic      Diagnosed with endometriosis. Stage 2 probably.        The family history and social history have been reviewed by me today.    Family History   Problem Relation Age of Onset    Breast Cancer Mother 40    Hypertension Mother     Asthma Mother     Cancer Father 72        LEUKEMIA    No Known Problems Son     No Known Problems Son     No Known Problems Maternal Grandmother     No Known Problems Maternal Grandfather     Diabetes Paternal Grandmother     Heart Disorder Paternal Grandmother     No Known Problems Paternal Grandfather      Social History     Socioeconomic History    Marital status:    Occupational History    Occupation:    Tobacco Use    Smoking status: Never    Smokeless tobacco: Never   Vaping Use    Vaping status: Never Used   Substance and Sexual Activity    Alcohol use: Not Currently     Alcohol/week: 2.0 standard drinks of alcohol     Comment: Socially    Drug use: Never    Sexual activity: Yes     Partners: Male     Birth control/protection: OCP   Other Topics Concern    Caffeine Concern No    Stress Concern Yes    Weight Concern  Yes    Special Diet No    Exercise No    Seat Belt No        Current Outpatient Medications:     semaglutide-weight management 0.25 MG/0.5ML Subcutaneous Solution Auto-injector, Inject 0.5 mL (0.25 mg total) into the skin once a week., Disp: 2 mL, Rfl: 0    metFORMIN 500 MG Oral Tab, Take 1 tablet (500 mg total) by mouth 2 (two) times daily with meals., Disp: 60 tablet, Rfl: 2    Naratriptan HCl 2.5 MG Oral Tab, Take 1 tablet (2.5 mg total) by mouth as needed for Migraine (Take every 4 hours for migraines - max 2 tablets/24 hours)., Disp: 30 tablet, Rfl: 1    norethindrone 5 MG Oral Tab, TAKE 1/2 TABLET(2.5 MG) BY MOUTH DAILY, Disp: 45 tablet, Rfl: 2    buPROPion ER (WELLBUTRIN XL) 150 MG Oral Tablet 24 Hr, Take 1 tablet (150 mg total) by mouth every morning., Disp: 30 tablet, Rfl: 3    OLANZapine 5 MG Oral Tab, Take 1 tablet (5 mg total) by mouth nightly., Disp: 30 tablet, Rfl: 3    clonazePAM 0.5 MG Oral Tab, Take 1 tablet (0.5 mg total) by mouth 2 (two) times daily., Disp: 60 tablet, Rfl: 5    HYDROcodone-acetaminophen 5-325 MG Oral Tab, Take 1 tablet by mouth every 6 (six) hours as needed for Pain. (Patient not taking: Reported on 1/6/2025), Disp: 15 tablet, Rfl: 0      Review of Systems  A 10 point Review of Systems has been completed by me today and is negative except as above in the HPI.    Physical Findings   /79 (BP Location: Left arm, Patient Position: Sitting, Cuff Size: adult)   Pulse 95   Temp 98 °F (36.7 °C) (Temporal)   Ht 61.5\"   Wt 214 lb (97.1 kg)   LMP 06/01/2022 (Within Months)   SpO2 98%   BMI 39.78 kg/m²   Gen/psych: alert and oriented, cooperative, no apparent distress  Cardiovascular: regular rate  Respiratory: respirations unlabored, no wheeze  Abdominal: soft, non-tender, non-distended, no guarding/rebound  Incisions:  Incisions have healed well.  Mild periumbilical healing ecchymosis.  There is no surrounding erythema.  There is no drainage.       Assessment/Plan  1.  Post-operative state        The patient is doing well following laparoscopic cholecystectomy.  She is to continue diet as tolerated.  She is to continue with lifting restrictions of no more than 20 pounds for 4 weeks with a date of her surgery.  Surgical pathology was discussed with the patient.  I discussed with the patient that her nausea should resolve over time.  If she is still experiencing nausea or other symptoms at 6-8 weeks after surgery, she should call our office and schedule a follow-up appointment.        No orders of the defined types were placed in this encounter.       Imaging & Referrals   None    Follow Up  Return if symptoms worsen or fail to improve.    Katharina Harp PA-C  [facility] General Surgery  1/6/2025  2:41 PM

## 2025-01-16 ENCOUNTER — PATIENT MESSAGE (OUTPATIENT)
Dept: INTERNAL MEDICINE CLINIC | Facility: CLINIC | Age: 45
End: 2025-01-16

## 2025-01-16 DIAGNOSIS — E66.811 OBESITY, CLASS I, BMI 30-34.9: ICD-10-CM

## 2025-01-16 DIAGNOSIS — E66.01 MORBID OBESITY (HCC): Primary | ICD-10-CM

## 2025-01-16 RX ORDER — SEMAGLUTIDE 0.25 MG/.5ML
INJECTION, SOLUTION SUBCUTANEOUS
Qty: 2 ML | Refills: 0 | Status: SHIPPED | OUTPATIENT
Start: 2025-01-16

## 2025-01-16 NOTE — TELEPHONE ENCOUNTER
Name from pharmacy: WEGOVY 0.25MG/0.5ML INJ ( 4 PENS)         Will file in chart as: WEGOVY 0.25 MG/0.5ML Subcutaneous Solution Auto-injector    Sig: ADMINISTER 0.25 MG UNDER THE SKIN 1 TIME A WEEK    Disp: 2 mL    Refills: 0 (Pharmacy requested: Not specified)    Start: 1/16/2025    Class: Normal    Non-formulary For: Obesity, Class I, BMI 30-34.9    Last ordered: 1 month ago (12/16/2024) by Paula Irwin MD    Last refill: 12/24/2024    Rx #: [id number]       To be filled at: [facility] #[id number] [place], [place] - [facility] [address] AT [facility] [address], [phone], [phone]          LOV:  8/13/24  RTC: 3-6 months  Recent Labs: 8/17/24    Upcoming OV none

## 2025-01-20 ENCOUNTER — PATIENT MESSAGE (OUTPATIENT)
Facility: LOCATION | Age: 45
End: 2025-01-20

## 2025-01-20 ENCOUNTER — TELEPHONE (OUTPATIENT)
Dept: SURGERY | Facility: CLINIC | Age: 45
End: 2025-01-20

## 2025-01-20 ENCOUNTER — TELEPHONE (OUTPATIENT)
Facility: LOCATION | Age: 45
End: 2025-01-20

## 2025-01-20 NOTE — TELEPHONE ENCOUNTER
Per patient had gallbladder surgery Dec 23rd, and then she saw Maria Luisa Jan 6th. Patient noticed the other days after taking a showing that one of the stiches came out, she called the emergency line because she noticed some puss. The emergency doctor told her to get Zoe and make an appointment.  gt

## 2025-01-20 NOTE — TELEPHONE ENCOUNTER
Noted. Phone message directed to me in error. Called patient, instructed her to call the surgeon's office for further instructions.

## 2025-01-20 NOTE — TELEPHONE ENCOUNTER
Patient states that her incision on her belly is open and she thinks that it may be infected.     Please advise   CB# [phone]

## 2025-01-23 NOTE — TELEPHONE ENCOUNTER
Prior authorization form for WEGOVY 0.25 MG/0.5 ML SOLUTION AUTO-INJECTORS placed in Dr. Irwin's in-box.

## 2025-01-28 DIAGNOSIS — E66.01 MORBID OBESITY (HCC): ICD-10-CM

## 2025-01-28 DIAGNOSIS — R73.03 PREDIABETES: ICD-10-CM

## 2025-01-28 RX ORDER — SEMAGLUTIDE 0.5 MG/.5ML
0.5 INJECTION, SOLUTION SUBCUTANEOUS WEEKLY
Qty: 4 EACH | Refills: 0 | Status: SHIPPED | OUTPATIENT
Start: 2025-01-28

## 2025-01-28 NOTE — TELEPHONE ENCOUNTER
LOV: 8/13/2024 with Dr. Irwin  RTC: 3-6 months  Last Relevant Labs: December 2024  Filled: 11/26/2024    #60 with 2 refills    Future Appointments   Date Time Provider Department Center   2/27/2025 10:20 AM Maria Luisa Rizo APRN [id number] [facility]   3/25/2025  2:00 PM Irina Dawson APRN [id number]   6/23/2025  7:40 AM Raissa Rolle APRN [facility]

## 2025-01-28 NOTE — TELEPHONE ENCOUNTER
Approved    Prior authorization approved  Payer: [employer] [facility] Case ID: [id number]    [phone]  Note from payer: Prior authorization in place - if requesting an increased quantity, a quantity limit review can be submitted via fax - Prescriber details have been updated to match the prescriber directory.  Approval Details    Authorized from October 23, 2024 to November 22, 2025

## 2025-01-28 NOTE — TELEPHONE ENCOUNTER
Yes, can go up to 0.5 mg dose.  New prescription sent in - will probably need a new prior authorization for this dose

## 2025-02-20 ENCOUNTER — TELEPHONE (OUTPATIENT)
Dept: INTERNAL MEDICINE CLINIC | Facility: CLINIC | Age: 45
End: 2025-02-20

## 2025-02-20 ENCOUNTER — OFFICE VISIT (OUTPATIENT)
Dept: FAMILY MEDICINE CLINIC | Facility: CLINIC | Age: 45
End: 2025-02-20
Payer: COMMERCIAL

## 2025-02-20 VITALS
SYSTOLIC BLOOD PRESSURE: 96 MMHG | WEIGHT: 197 LBS | RESPIRATION RATE: 16 BRPM | DIASTOLIC BLOOD PRESSURE: 68 MMHG | TEMPERATURE: 98 F | OXYGEN SATURATION: 99 % | BODY MASS INDEX: 37 KG/M2 | HEART RATE: 118 BPM

## 2025-02-20 DIAGNOSIS — J02.0 STREP PHARYNGITIS: Primary | ICD-10-CM

## 2025-02-20 DIAGNOSIS — J02.9 SORE THROAT: ICD-10-CM

## 2025-02-20 LAB
CONTROL LINE PRESENT WITH A CLEAR BACKGROUND (YES/NO): YES YES/NO
OPERATOR ID: NORMAL
RAPID SARS-COV-2 BY PCR: NOT DETECTED

## 2025-02-20 RX ORDER — CEPHALEXIN 500 MG/1
500 CAPSULE ORAL 2 TIMES DAILY
Qty: 20 CAPSULE | Refills: 0 | Status: SHIPPED | OUTPATIENT
Start: 2025-02-20 | End: 2025-03-02

## 2025-02-20 NOTE — TELEPHONE ENCOUNTER
S/w pt and spouse.   She states Fever, body aches, sore throat for 4 days.   102.8 the highest temp. Unsure of temp today, doesn't feel warm or chills today.   Pt is taking Dayquil and tylenol. She stated her throat hurts the most. Hurts to talk and swallow.     Advised it could be strep or it could just be a virus. No openings today. Stated could go to [facility]  To rule out strep or virus. Also gave recs on supportive care at home.     They v/u, will go to [facility]IC.

## 2025-02-20 NOTE — TELEPHONE ENCOUNTER
Pt calling for appt. Pt having fevers of 102.8 and body aches for 4 days. Asking if she can be worked into schedule or if she should go to [facility]?  Please advise..

## 2025-02-20 NOTE — PROGRESS NOTES
CHIEF COMPLAINT:     Chief Complaint   Patient presents with    Sore Throat     Sore throat x 2 days fever body aches x 4 days       HPI:   Elidia Olmstead is a 44 year old female presents to clinic with symptoms of sore throat for two days, as well as fever (Tmax 102.8) and body aches for 4 days. Symptoms have been consistent since onset.   Patient denies headache. Patient denies stomach upset. Patient denies rash.  Patient denies history of strep. 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  Treating symptoms with: Motrin, Tylenol, Robitussin, Nyquil, and Dayquil. Also trying salt water gargles and tea with honey.      Current Outpatient Medications   Medication Sig Dispense Refill    cephALEXin 500 MG Oral Cap Take 1 capsule (500 mg total) by mouth 2 (two) times daily for 10 days. 20 capsule 0    buPROPion  MG Oral Tablet 24 Hr Take 1 tablet (150 mg total) by mouth every morning. Patient will see new provider on 3/25 50 tablet 0    semaglutide-weight management (WEGOVY) 0.5 MG/0.5ML Subcutaneous Solution Auto-injector Inject 0.5 mL (0.5 mg total) into the skin once a week. 4 each 0    metFORMIN 500 MG Oral Tab Take 1 tablet (500 mg total) by mouth 2 (two) times daily with meals. 60 tablet 3    Naratriptan HCl 2.5 MG Oral Tab Take 1 tablet (2.5 mg total) by mouth as needed for Migraine (Take every 4 hours for migraines - max 2 tablets/24 hours). 30 tablet 1    norethindrone 5 MG Oral Tab TAKE 1/2 TABLET(2.5 MG) BY MOUTH DAILY 45 tablet 2    OLANZapine 5 MG Oral Tab Take 1 tablet (5 mg total) by mouth nightly. 30 tablet 3    clonazePAM 0.5 MG Oral Tab Take 1 tablet (0.5 mg total) by mouth 2 (two) times daily. 60 tablet 5    HYDROcodone-acetaminophen 5-325 MG Oral Tab Take 1 tablet by mouth every 6 (six) hours as needed for Pain. (Patient not taking: Reported on 2/20/2025) 15 tablet 0      Past Medical History:    Abnormal uterine bleeding    Allergic rhinitis     Anxiety    Bipolar affective (HCC)    Calculus of kidney    Dense breasts    Heterogeneously dense    Depression    Dyspareunia    Endometriosis    surgically confirmed per patient report. No op note available.     Essential hypertension    Fibroids    Fibromyalgia    Galactorrhea    Mammogram & breast US with dilated retroareolar duct but otherwise normal. Prolactin normal 9/2020.     High cholesterol    History of blood transfusion    History of mammogram    Mammogram BILAT & US breast RIGHT - dense breasts, mildly dilated right retroareolar duct.     History of MRSA infection    History of pelvic ultrasound    6/8/22 Pelvic US - amenorrheic x 9 months on continuous progestin only OCP Slynd. Stable 1.5 cm subserosal left fundal fibroid. Small cysts bilateral ovaries (1.6 cm) consistent with follicles. Agree with previous visit suggestion to try NE 2.5 mg HS continuously to suppress ovulation better.     HPV vaccine counseling    Received HPV vaccine series.     Hyperlipidemia    Hypothyroidism    Infertility associated with anovulation    IVF    Migraine with aura    No estrogen    Migraines    Obesity    Pap smear for cervical cancer screening    Pap & HPV negative.     Post partum depression    Prediabetes    Right ovarian cyst    Pelvic US 8/7/2020 - CD12. Probable functional cyst in the right ovary 2.9 cm. Tiny 18 mm subserosal left sided fundal fibroid (likely incidental).   6/8/22 Pelvic US - ovaries with 1.6 cm simple cysts/follicles bilaterally.      Sleep apnea    CPAP    Subserosal leiomyoma of uterus    Incidental 1.8 cm left fundal submucous fibroid.     Type 1 diabetes mellitus (HCC)    Visual impairment    GLASSES    Vitamin D deficiency      Social History:  Social History     Socioeconomic History    Marital status:    Occupational History    Occupation:    Tobacco Use    Smoking status: Never    Smokeless tobacco: Never   Vaping Use    Vaping status: Never Used   Substance and Sexual  Activity    Alcohol use: Not Currently     Alcohol/week: 2.0 standard drinks of alcohol     Comment: Socially    Drug use: Never    Sexual activity: Yes     Partners: Male     Birth control/protection: OCP   Other Topics Concern    Caffeine Concern No    Stress Concern Yes    Weight Concern Yes    Special Diet No    Exercise No    Seat Belt No        REVIEW OF SYSTEMS:   GENERAL HEALTH:  See HPI  SKIN: see HPI  HEENT: denies ear pain, See HPI  RESPIRATORY: denies shortness of breath, or wheezing  CARDIOVASCULAR: denies chest pain, palpitations   GI: denies abdominal pain, constipation and diarrhea  NEURO: denies dizziness or lightheadedness    EXAM:   BP 96/68   Pulse 118   Temp 98.2 °F (36.8 °C) (Oral)   Resp 16   Wt 197 lb (89.4 kg)   LMP 06/01/2022 (Within Months)   SpO2 99%   BMI 36.62 kg/m²   GENERAL: well developed, well nourished,in no apparent distress  SKIN: no rashes,no suspicious lesions  HEAD: atraumatic, normocephalic  EYES: conjunctiva clear, EOM intact  EARS: TM's cloudy with some fluid. Non-injected, no bulging, retraction.  NOSE: nostrils patent, no exudates, nasal mucosa pink and noninflamed  THROAT: oral mucosa pink, moist. Posterior pharynx erythematous and injected. Bilateral exudates. Tonsils 2/4.  Breath not noted to be malodorous. No uvular deviation. No drooling.  NECK: supple  LUNGS: clear to auscultation bilaterally, no wheezes or rhonchi. Breathing is non labored.  CARDIO: RRR without murmur  GI: good BS's,no masses, hepatosplenomegaly, or tenderness on direct palpation  EXTREMITIES: no cyanosis, clubbing or edema  LYMPH: Positive anterior cervical and submandibular lymphadenopathy.  No posterior cervical or occipital lymphadenopathy.    Recent Results (from the past 24 hours)   Rapid Strep    Collection Time: 02/20/25 11:44 AM   Result Value Ref Range    Strep Grp A Screen Positvie Negative    Control Line Present with a clear background (yes/no) Yes Yes/No    Kit Lot # [id number]  Numeric    Kit Expiration Date 12/20/2025 Date   Rapid Covid-19    Collection Time: 02/20/25 11:54 AM    Specimen: Nares   Result Value Ref Range    Rapid SARS-CoV-2 by PCR Not Detected Not Detected    POCT Lot Number [id number]     POCT Expiration Date 8/2/25     POCT Procedure Control Control Valid Control Valid     ,149          ASSESSMENT AND PLAN:   Assessment:   Encounter Diagnoses   Name Primary?    Sore throat     Strep pharyngitis Yes         Plan:  Comfort Measures discussed and listed in Patient Instructions. Prescription: as below.     Requested Prescriptions     Signed Prescriptions Disp Refills    cephALEXin 500 MG Oral Cap 20 capsule 0     Sig: Take 1 capsule (500 mg total) by mouth 2 (two) times daily for 10 days.       Risks, benefits, complications and side effects of meds discussed with patient.     OTC Tylenol/Motrin prn.   Push fluids- warm or cool liquids, whichever is soothing for patient  If treated with antibiotics, change tooth brush after on medication for 48 hours.   Warm salt water gargles 2 times per day for at least 3 days.    Do not share utensils or drinks with anyone.      Follow up with PCP if not improving, condition worsens, or fever greater than or equal to 100.4 persists for 72 hours.      The patient/parent indicates understanding of these issues and agrees to the plan.  The patient is asked to follow up with their PCP prn.    There are no Patient Instructions on file for this visit.     no abrasions, no jaundice, no lesions, no pruritis, and no rashes.

## 2025-02-26 DIAGNOSIS — E66.01 MORBID OBESITY (HCC): ICD-10-CM

## 2025-02-26 NOTE — TELEPHONE ENCOUNTER
Name from pharmacy: WEGOVY 0.5MG/0.5ML INJ (4 PENS)         Will file in chart as: WEGOVY 0.5 MG/0.5ML Subcutaneous Solution Auto-injector    Sig: INJECT 0.5 MG UNDER THE SKIN ONCE WEEKLY    Disp: 2 mL    Refills: 0 (Pharmacy requested: Not specified)    Start: 2/26/2025    Class: Normal    Non-formulary For: Morbid obesity (HCC)    Last ordered: 4 weeks ago (1/28/2025) by Paula Irwin MD    Last refill: 2/7/2025    Rx #: [id number]       To be filled at: [id number] [address] AT [facility] OF [facility] [address], [phone], [phone]          LOV:08/13/2024  RTC:3-6 months   Labs:n/a   Last filled:02/07/2025  Future Appointments   Date Time Provider Department Center   3/25/2025  2:00 PM Irina Dawson APRN LOMGARAM JULESMG [facility]   6/23/2025  7:40 AM Raissa Rolle APRN EMGWEI [facility]   7/3/2025  9:20 AM Maria Luisa Rizo APRN [id number] EMM[facility]

## 2025-02-27 RX ORDER — SEMAGLUTIDE 0.5 MG/.5ML
INJECTION, SOLUTION SUBCUTANEOUS
Qty: 2 ML | Refills: 0 | Status: SHIPPED | OUTPATIENT
Start: 2025-02-27

## 2025-02-27 NOTE — TELEPHONE ENCOUNTER
Future Appointments   Date Time Provider Department Center   3/25/2025  2:00 PM Irina Dawson APRN L[facility]   4/8/2025  5:30 PM Paula Irwin MD [facility] [place]   6/23/2025  7:40 AM Raissa Rolle APRN EMGWEI [facility] [address]   7/3/2025  9:20 AM Maria Luisa Rizo APRN [id number] SAJI [facility]

## 2025-04-08 ENCOUNTER — OFFICE VISIT (OUTPATIENT)
Dept: INTERNAL MEDICINE CLINIC | Facility: CLINIC | Age: 45
End: 2025-04-08
Payer: COMMERCIAL

## 2025-04-08 VITALS
WEIGHT: 199.38 LBS | SYSTOLIC BLOOD PRESSURE: 116 MMHG | BODY MASS INDEX: 37.16 KG/M2 | HEART RATE: 85 BPM | OXYGEN SATURATION: 98 % | RESPIRATION RATE: 16 BRPM | TEMPERATURE: 98 F | HEIGHT: 61.25 IN | DIASTOLIC BLOOD PRESSURE: 76 MMHG

## 2025-04-08 DIAGNOSIS — E78.2 MIXED HYPERLIPIDEMIA: Chronic | ICD-10-CM

## 2025-04-08 DIAGNOSIS — R73.03 PREDIABETES: ICD-10-CM

## 2025-04-08 DIAGNOSIS — Z00.00 ENCOUNTER FOR PREVENTATIVE ADULT HEALTH CARE EXAMINATION: Primary | ICD-10-CM

## 2025-04-08 DIAGNOSIS — M79.89 LEG SWELLING: ICD-10-CM

## 2025-04-08 DIAGNOSIS — Z12.11 SCREENING FOR MALIGNANT NEOPLASM OF COLON: ICD-10-CM

## 2025-04-08 DIAGNOSIS — E66.812 OBESITY, CLASS II, BMI 35-39.9: ICD-10-CM

## 2025-04-08 DIAGNOSIS — L98.9 SKIN LESION OF RIGHT ARM: ICD-10-CM

## 2025-04-08 PROCEDURE — 3078F DIAST BP <80 MM HG: CPT | Performed by: INTERNAL MEDICINE

## 2025-04-08 PROCEDURE — 99396 PREV VISIT EST AGE 40-64: CPT | Performed by: INTERNAL MEDICINE

## 2025-04-08 PROCEDURE — 3008F BODY MASS INDEX DOCD: CPT | Performed by: INTERNAL MEDICINE

## 2025-04-08 PROCEDURE — 3074F SYST BP LT 130 MM HG: CPT | Performed by: INTERNAL MEDICINE

## 2025-04-08 RX ORDER — SEMAGLUTIDE 1 MG/.5ML
1 INJECTION, SOLUTION SUBCUTANEOUS WEEKLY
Qty: 4 EACH | Refills: 0 | Status: SHIPPED | OUTPATIENT
Start: 2025-04-08

## 2025-04-08 NOTE — PROGRESS NOTES
Elidia Olmstead is a 44 year old female.   HPI:     Chief Complaint   Patient presents with    CPX     Reviewed Preventative/Wellness form with patient.      Patient presents for CPX/wellness examination.  Diet: Doing more healthy food.   Exercise: Occasional  Vision: Wears glasses - up to date with eye exam, next one due in May  Dental: Last cleaning was 2 months ago  Obesity - Body mass index is 37.37 kg/m².  Down 25 lbs since starting Wegovy.  Tolerating so far.      Acute issues:  Has a pruritic lesion on right forearm that looks a little different from her typical skin lesions.  It is raised.    Chronic issues:  She has been dealing with swelling in her legs and feet - intermittent. This has been going on for years.  Hyperlipidemia - lifestyle controlled.  Prediabetes - she is on semaglutide and metformin, more for her weight.    Past medical, family, surgical and social history were reviewed as listed in the chart, and are unchanged from previous visit.  REVIEW OF SYSTEMS:   GENERAL/ const: no fevers/chills, no unintentional weight loss  SKIN: denies any unusual skin lesions  EYES:no vision problems  HEENT: denies sinus pain or sinus tenderness  LUNGS: denies shortness of breath   CARDIOVASCULAR: denies chest pain  GI: denies nausea/emesis/ abdominal pain diarrhea constipation  : denies dysuria   NEURO: denies headaches  ENDOCRINE: no hot/cold intolerance  ALLERGY: Allergies[1]  PAST HISTORY:     Current Outpatient Medications:     Cholecalciferol (VITAMIN D3) 250 MCG (43246 UT) Oral Cap, Take 1 capsule by mouth daily., Disp: , Rfl:     semaglutide-weight management (WEGOVY) 1 MG/0.5ML Subcutaneous Solution Auto-injector, Inject 0.5 mL (1 mg total) into the skin once a week., Disp: 4 each, Rfl: 0    OLANZapine 5 MG Oral Tab, Take 1 tablet (5 mg total) by mouth nightly., Disp: 90 tablet, Rfl: 0    buPROPion  MG Oral Tablet 24 Hr, Take 1 tablet (150 mg total) by mouth every morning. Patient will see new  provider on 3/25, Disp: 90 tablet, Rfl: 0    clonazePAM 0.5 MG Oral Tab, Take 1-2 tablets at bedtime as needed for anxiety, Disp: 60 tablet, Rfl: 2    metFORMIN 500 MG Oral Tab, Take 1 tablet (500 mg total) by mouth 2 (two) times daily with meals., Disp: 60 tablet, Rfl: 3    Naratriptan HCl 2.5 MG Oral Tab, Take 1 tablet (2.5 mg total) by mouth as needed for Migraine (Take every 4 hours for migraines - max 2 tablets/24 hours)., Disp: 30 tablet, Rfl: 1    norethindrone 5 MG Oral Tab, TAKE 1/2 TABLET(2.5 MG) BY MOUTH DAILY, Disp: 45 tablet, Rfl: 2  Medical:  has a past medical history of Abnormal uterine bleeding, Allergic rhinitis, Anxiety, Bipolar affective (HCC), Calculus of kidney, Dense breasts (2021), Depression (), Dyspareunia, Endometriosis (), Fibroids, Fibromyalgia, Galactorrhea (2020), High cholesterol, History of blood transfusion, History of mammogram (2020), History of MRSA infection, History of pelvic ultrasound (2022), HPV vaccine counseling, Hyperlipidemia (), Infertility associated with anovulation, Migraine with aura, Migraines, Obesity (), Pap smear for cervical cancer screening (2021), Post partum depression, Prediabetes, Right ovarian cyst (2020), Sleep apnea, Subserosal leiomyoma of uterus (2020), Type 1 diabetes mellitus (HCC) (2024), Visual impairment, and Vitamin D deficiency ().  Surgical:  has a past surgical history that includes  (); laparoscopy,diagnostic ();  (); and colonoscopy ().  Family: family history includes Asthma in her mother; Breast Cancer (age of onset: 40) in her mother; Cancer (age of onset: 72) in her father; Diabetes in her paternal grandmother; Heart Disorder in her paternal grandmother; Hypertension in her mother; No Known Problems in her maternal grandfather, maternal grandmother, paternal grandfather, son, and son.  Social:  reports that she has never smoked. She has  never used smokeless tobacco. She reports current alcohol use. She reports that she does not use drugs.  Wt Readings from Last 6 Encounters:   04/08/25 199 lb 6.4 oz (90.4 kg)   02/20/25 197 lb (89.4 kg)   01/06/25 214 lb (97.1 kg)   12/23/24 214 lb 6.4 oz (97.3 kg)   12/09/24 217 lb (98.4 kg)   08/13/24 225 lb (102.1 kg)     EXAM:   /76 (BP Location: Right arm, Patient Position: Sitting, Cuff Size: large)   Pulse 85   Temp 98 °F (36.7 °C) (Temporal)   Resp 16   Ht 5' 1.25\" (1.556 m)   Wt 199 lb 6.4 oz (90.4 kg)   LMP 06/01/2022 (Within Months)   SpO2 98%   Breastfeeding No   BMI 37.37 kg/m²   GENERAL: Alert and oriented, well developed, well nourished,in no apparent distress  SKIN: no rashes,no suspicious lesions  HEENT: atraumatic, PERRLA, EOMI, normal lid and conjunctiva, normal external canals and tympanic membranes bilaterally  NECK: supple, no jvd, no thyromegaly, no palpable/tender cervical lymphadenopathy  LUNGS: clear to auscultation bilaterally, no wheezing/rubs  CARDIO: RRR without murmurs.  No clubbing, cyanosis or edema.  GI: soft non tender nondistended no hepatosplenomegaly, bowel sounds throughout  NEURO: CN II-XII intact, 5/5 strength all extremities  MS: Full ROM  PSYCH: pleasant, appropriate mood and affect  ASSESSMENT AND PLAN:   1.  Encounter for preventative adult health examination  2. Screening for malignant neoplasm of colon  3. Obesity, Class II, BMI 35-39.9  Age appropriate health guidance and counseling provided.  Screening labs ordered as below.  Body mass index is 37.37 kg/m².  Down 25 lbs on Wegovy.  Will continue at next higher dose.  Referred to GI for screening colonoscopy.  Up to date with pap smear.  - Comp Metabolic Panel (14); Future  - Hemoglobin A1C; Future  - Gastro Referral - In Network  - semaglutide-weight management (WEGOVY) 1 MG/0.5ML Subcutaneous Solution Auto-injector; Inject 0.5 mL (1 mg total) into the skin once a week.  Dispense: 4 each; Refill:  0    4. Mixed hyperlipidemia  Lifestyle controlled. 10 year ASCVD risk 1.1%.      5. Prediabetes  On semaglutide, metformin, more for her obesity.  Will check updated labs.  - Comp Metabolic Panel (14); Future  - Hemoglobin A1C; Future    6. Skin lesion of right arm  Raised lesion right forearm.  Will have patient follow up with Dermatology.  - Derm Referral - External    7. Leg swelling  Chronic issue, going on for several years.  Unremarkable echo.  Creatinine/GFR, liver enzymes ok.  Will have patient follow up with Vascular.   - Vascular Surgery - Dr. Samer Najjar [facility] [address]    Patient Care Team:  Paula Irwin MD as PCP - General (Internal Medicine)  Landy Jones MD (OBSTETRICS & GYNECOLOGY)  Edda Miranda DO (OBSTETRICS & GYNECOLOGY)  Alice Escobar MD as Obstetrician (OBSTETRICS & GYNECOLOGY)  Aris Vegas DO as Psychiatrist/APN (Psychiatry)  The patient indicates understanding of these issues and agrees to the plan.  The patient is asked to return to clinic in 3-6 months for follow up on chronic issues, or earlier if acute issues arise.    Paula Irwin MD             [1]   Allergies  Allergen Reactions    Other NAUSEA AND VOMITING and UNKNOWN     Coconut    Seasonal UNKNOWN     RUNNY NOSE, ITCHY EYES

## 2025-04-08 NOTE — PATIENT INSTRUCTIONS
- Get blood tests done when fasting (8 hours)  - Follow up with Dermatology for your skin bump:  Dr. Carter and [place]  [address]  [address]  [place] [address]  [phone]    - Follow up with Vascular Surgery for your leg swelling:  Dr. Najjar and B[place]  [address]  [address]  [place] [address]  [phone]    - Follow up with [facility] for colonoscopy  [address] [place] [address]  ([address])  [phone]    [address] [address],  [address]  [place] [address]  ([address])  [phone]    - Next dose of Wegovy refilled.  Please follow up with the weight loss clinic for future refills after that.    It was a pleasure seeing you in the clinic today.  Thank you for choosing the [facility] [place] office for your healthcare needs. Please call at [phone] with any questions or concerns.    Paula Irwin MD

## 2025-04-09 ENCOUNTER — TELEPHONE (OUTPATIENT)
Dept: INTERNAL MEDICINE CLINIC | Facility: CLINIC | Age: 45
End: 2025-04-09

## 2025-04-09 NOTE — TELEPHONE ENCOUNTER
Approved    Prior authorization approved  Payer: [employer] [facility] Case ID: [id number]    [phone]    [phone]  Note from payer: Prior authorization in place - if requesting an increased quantity, a quantity limit review can be submitted via fax - Prescriber details have been updated to match the prescriber directory.  Approval Details    Authorized from October 23, 2024 to November 22, 2025

## 2025-05-05 DIAGNOSIS — E66.812 OBESITY, CLASS II, BMI 35-39.9: ICD-10-CM

## 2025-05-05 NOTE — TELEPHONE ENCOUNTER
LOV: 4/8/2025 with Dr. Irwin  RTC: 3-6 months  Last Relevant Labs: 8/17/2024  Filled: 4/8/2025    #4 each with 0 refills    Future Appointments   Date Time Provider Department Center   6/23/2025  7:40 AM Raissa Rolle APRN EMGWEI [facility] [address]   6/23/2025  2:00 PM Irina Dawson APRN LOMGAR[facility]   7/3/2025  9:20 AM Maria Luisa Rizo APRN [id number] SAJI [facility]

## 2025-05-06 RX ORDER — SEMAGLUTIDE 1 MG/.5ML
INJECTION, SOLUTION SUBCUTANEOUS
Qty: 2 ML | Refills: 0 | OUTPATIENT
Start: 2025-05-06

## 2025-06-23 ENCOUNTER — OFFICE VISIT (OUTPATIENT)
Dept: INTERNAL MEDICINE CLINIC | Facility: CLINIC | Age: 45
End: 2025-06-23
Payer: COMMERCIAL

## 2025-06-23 VITALS
OXYGEN SATURATION: 98 % | SYSTOLIC BLOOD PRESSURE: 126 MMHG | BODY MASS INDEX: 37.11 KG/M2 | RESPIRATION RATE: 18 BRPM | HEART RATE: 99 BPM | HEIGHT: 60 IN | WEIGHT: 189 LBS | DIASTOLIC BLOOD PRESSURE: 80 MMHG

## 2025-06-23 DIAGNOSIS — G47.33 OBSTRUCTIVE SLEEP APNEA: ICD-10-CM

## 2025-06-23 DIAGNOSIS — R73.03 PREDIABETES: ICD-10-CM

## 2025-06-23 DIAGNOSIS — E03.9 HYPOTHYROIDISM, UNSPECIFIED TYPE: Chronic | ICD-10-CM

## 2025-06-23 DIAGNOSIS — F31.32 BIPOLAR 1 DISORDER, DEPRESSED, MODERATE (HCC): ICD-10-CM

## 2025-06-23 DIAGNOSIS — Z51.81 THERAPEUTIC DRUG MONITORING: Primary | ICD-10-CM

## 2025-06-23 DIAGNOSIS — F43.9 STRESS: ICD-10-CM

## 2025-06-23 DIAGNOSIS — M79.7 FIBROMYALGIA: ICD-10-CM

## 2025-06-23 DIAGNOSIS — E78.2 MIXED HYPERLIPIDEMIA: Chronic | ICD-10-CM

## 2025-06-23 DIAGNOSIS — E66.9 OBESITY (BMI 30-39.9): ICD-10-CM

## 2025-06-23 NOTE — PATIENT INSTRUCTIONS
Next steps:  1.  Fill your prescribed medication and take as discussed and prescribed: wegovy 1.7mg weekly   2.  Call our office at [phone] to schedule a personal nutrition consultation with one of our registered dietician. Bring along your food journal (3 days minimum). See journal options below.  3.  Body composition completed today with findings of: Total body fat: 42.4% (goal < 32%), Visceral Fat: 13 (goal <10), Muscle mass: 26.1%  4.  Use contraception at all times while on anti-obesity medications.     Please try to work on the following dietary changes:  Daily protein recommendation to start:  grams  Daily carbohydrate:  <130g  Daily calories: 1,400-1,500  1.  Drink water with meals and throughout the day, cut down on soda and/or juice if consumed. Consider flavored water options like Bubbly, Spindrift, Hint and Jane. Reduce alcohol servings to 4 per week maximum.  2.  Have protein with each meal, examples include: greek yogurt, cottage cheese, hard boiled egg, tofu, chicken, fish, or tuna. Recommended daily protein intake is 100 grams/day.   3.  Work towards reducing/eliminating refined carbohydrates and sugars which includes items such as sweets, as well as rice, pasta, and bread and make sure to choose whole grain options when having them with just 1 serving per meal about the size of your inner palm.  4.  Consume non starchy veggies daily working towards making them a good 50% of your daily food intake. Add them to lunch and dinner consistently.  5.  Start a daily probiotic: VSL#3 is recommended, (order on line at www.vsl3.com). Take 1 capsule daily with water for 30 days, then reduce to 1 every other day (this will reduce the cost). Capsules can be left out of refrigerator for 2 weeks. I recommend using a pill box weekly and keeping the bottle in the fridge.     Please download maynor My Fitness Pal, LoseIt! Or My Net Diary to monitor daily dietary intake and you will be able to see if you are  eating the right amount of calories or too much or too little which would hinder weight loss. Additionally this will help to see your daily carbohydrate and protein intake. When you set the tara up choose 1.5 lbs/week as a goal.  Keeping a paper food journal is an option as well to remain accountable for your choices- this is the start to mindful eating! A low calorie diet has been consistently shown to support weight loss.     Continue or start exercising to help establish a routine. If not already exercising begin with 1 day/week and progress as able with the goal of working towards 30 minutes 5 days a week at a minimum. A variety or cardio, strength and stretching is important. Review resources below to help support you in building this healthy routine.     Meditation daily can help manage and control stress. Chronic stress can make weight loss difficult.  Exercising is one way to help with stress, but meditation using the CALM Tara or another comparable alternative can be done in your home or place of work with little time commitment. This Tara can also help work on behavior change and improve sleep. Check out the segment under Calm Masterclass and listen to The 4 Pillars of Health. A great way to begin learning about the foundation of lifestyle with practical tips to use in your every day. In addition, we offer counseling services and support for individual connection and care. A referral is necessary so please let me know if this is a service you are interested.     Check out www.yourweightmatters.org blog for continued support and education along your weight loss journey to optimal health!  Patient Resources:     Personal Training/Fitness Classes/Health Coaching     Health system in [place]: Full fitness center with group fitness and personal training located in [place].  Health Coaching with Daniel Lee, and Austin Rizo at our [facility]- individual coaching to work on  your health goals. Call [phone] and/or email @ [email or web address]. Free 60 minute consult when client of [facility].  AYALA Hughes @ http://www.[employer]. A variety of group fitness options plus various yoga classes [phone] and/or email Isabelle at [email or web address]  formerly [facility] Fitness Centers with multiple locations: VisTracks (www.OneHealth Solutions), iNeoMarketing5 Training (www.Chroma Therapeutics), Tuolar.com Body Bootcamp (www.ONFocus HealthcarebodybootGoodChime!p.modu), Vantage Analytics (www.Terraplay Systems), The Exercise  (www.exercisecoach.com), Club PilGoSurf Accessories (www.Utopia)     Online Fitness  Fitness  on NewGoTos  Fit in 10 DVD series                              www.vyvxf89ZNBVow To Be Chic  Chair exercises via Sit and Be Fit (www.sitandbefit.org) and BiggiFi (www.Hii Def Inc.) or Gemrán Diaz or Mendez Ruiz videos on YouTube.  Hip Hop Fit with Bryon Villalobosks at www.hiphopfit.[employer]oll[employer]     Apps for on the Go Fitness  Quinnesec 7 Minute Workout (orange box with white 7) - free on the go HIIT training tara  Peloton Tara @ www.onepeloton.com     Nutrition Trackers, Meal Preparation, and Other Meal Programs  LoseIT! And My Fitness Pal apps and on line for tracking nutrition  NOOM - virtual health coaching  FitFoundation (healthy meals on the go) in [place] @ www.vsphywakeszgd8y.modu  Vidhi SANCHEZ @ www.bistromd.modu and Svrhgt82 (calorie smart and low carb plans recommended) @ www.nxgdfa79.com, Metabolic Meals @ www.MyMetabolicMeals.com - individual prepared meals to go  Gobble, Blue Apron, Home , Every Plate, Sunbasket- on line meal delivery programs for preparation at home  Meal Village in [place] for homemade meals to go @ www.mealvillage.com  Diet Doctor @ www.dietdoctor.com - low carb swaps  ReciMe and Mealime tara (grocery and meal planning)     Stress, Anxiety, Depression, Trauma  CALM meditation tara (www.calm.com)  Headspace  Don't let anxiety run your life.  Using the science of emotion regulation and mindfulness to overcome fear and worry by Semaj Storm PsyD and Ebenezer Horton MA.  The iClinical Podcast (September 27, 2023): 6 Magic Words That Stop Anxiety  What Happened to You?- a look at the impact trauma has on behavior written by Demar Naqvi and Dr. Mario Salinas  Whole Again by Hay Eller - discovering your true self after trauma     Mindful Eating/The Hungry Brain  Am I Hungry? Mindful eating virtual  maynor (www.amihungry.com)  The Hungry Brain by Joie Covington, PhD  Mindless Eating by Javon Carmona  Weight Loss Surgery Will Not Treat Food Addiction by Luz Garvey Ph.D     Metabolic Dysfunction, Hormones and Cravings  Why We Get Sick? By Paul Dawson (insulin resistance)  Your Body in Balance: The New Science of Food, Hormones, and Health by Dr. Marvin Sanabria  The Complete Guide to fasting by Dr. Vasques  Fast Like a Girl by Dr. Sary Sunshine  The M Factor (documentary on PBS about Menopause)  Sugar, Salt & Fat by Anjali López, Ph.D, R.D.  The Truth About Sugar - documentary on sugar (Free on HireAHelper, https://youtu.be/4E8wcamHJ0g)  Presentation on SUGAR called Sugar: The Bitter Truth by Dr. Stalin Galaviz (HireAHelper) https://youtu.be/dBnniua6-oM?si=zeszq3nwj9un4uwb  Reverse Visceral Fat: #1 Way to Increase Your Lifespan & End Inflammation with Dr. Adria Toscano on Utube @ https://youMicromidasu.be/nupPRnvUpJY?si=bv7monZjDVJ6YmkV     Nutrition Support  You Are What You Eat - Netfix series on twin study looking at impact of nutrition changes on health  The End of Dieting: How to Live for Life by Dr. Chi Álvarez M.D. or listen to The [employer] Podcast Episode 63: Understanding \"Nutritarian\" Eating w/Dr. Chi Álvarez  The Game Changers- Netflix Documentary on plant based nutrition  The Dr. Richard T5 Wellness Plan by Dr. Curt Richard MD  The Complete Guide to fasting by Dr. Vasques  @[facility] (InstWhite Hospitalam Dietician with support surrounding nutrition and  meal prep/planning)     Education, Motivation and Support Resources  Live to 100: Secrets of the Blue Zones - Netflix series on the secrets to communities living over 100 years old  Atomic Habits by Carlin Levine (a book about taking small steps to promote greater behavior change)   Motivation maynor (black box with white \")- daily supportive messages sent to your phone  Can't Hurt Me by Semaj Owen (a book exploring the power of discipline in achieving your goals)  Fed Up - documentary about obesity (Free on Utube)  Www.yourweightmatters.org - Obesity Action Coalition sponsored Blog posts  Obesity Action Coalition Resources on topics specific to weight management (www.obesityaction.org)  Fitlosophy Fitspiration - journal to better health (journal book found at [employer] in fitness aisle)  Kathleen Valera talk titled: The Call to Courage (Netflix)  The Exam Room by the Physician's Committee (Podcast)  Nutrition Facts by Dr. Odom (Podcast)

## 2025-06-23 NOTE — PROGRESS NOTES
HISTORY OF PRESENT ILLNESS  Chief Complaint   Patient presents with    Weight Problem     Pcp referral.ok for med and injection. Is on wegovy since October.     Elidia Olmstead is a 44 year old female new to our office today for initiation of medical weight loss program.  Patient presents today with c/o excess weight. Referred by, PCP    Has been struggling weight gain for the past couple of years  Is currently taking Wegovy has been on it with PCP since October and has lost a total of 40 pounds overall  Admits that she has really changed food in her house (i.e. higher protein and smaller portion sizes)  Is sometimes only eating 1 time a day  Is not exercising due to fibromyalgia and multiple joint pain and time    Denies chest pain, shortness of breath, dizziness, blurred vision, headache, paresthesia, nausea/vomiting.     Reason/goal for weight loss: To close 40lbs in 6 months and Lose 60lbs in 1 year   Triggers for weight gain? Stress, Illness, Medication, and Injury  Previous weight loss programs? NO  Previous weight loss medications? wegovy    Reviewed [facility] patient contract: Readiness for Lifestyle change: 10/10, Interest in Medication: 10/10, Bariatric surgery interest: 10/10    Weight  Starting weight: 189  Max weight: 230  Lowest weight:165    Wt Readings from Last 6 Encounters:   06/23/25 189 lb (85.7 kg)   04/08/25 199 lb 6.4 oz (90.4 kg)   02/20/25 197 lb (89.4 kg)   01/06/25 214 lb (97.1 kg)   12/23/24 214 lb 6.4 oz (97.3 kg)   12/09/24 217 lb (98.4 kg)        Typical diet   Breakfast Lunch Dinner Snacks Fluids   No breakfast or protein shake  Nothing  Ground beef with sweet potatoes, cottage cheese and avocado  none Water: adequate   Soda:  Juice: juice   Coffee/Tea: tea      Portion: medium   Eats 3 meals per day: yes   Number of restaurant or fast food meals/week: 1-2 meals/week    Social hx and lifestyle reviewed:    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)   Marital status:    Support: yes  Tobacco  use: none  ETOH use:  0 per/week  Supplements: none  Exercise: none  Stress level: 10/10  Sleep hours and integrity: 10 Hours per night    MEDICAL HISTORY  PMH reviewed:   Cardiac disorders: negative   Depression/anxiety: +anxiety and depression- currently seeing psych  Glaucoma: negative  Kidney stones: +  Eating disorder: negative  Migraines/seizures: migraines   Joint-related conditions: multiple joint pain- fibro  Liver disease: negative  Thyroid disease: hx of hypothyroid   Constipation: negative  Diabetes: prediabetes  Sleep Apnea hx: SAI- has CPAP machine but doesn't use it   Cancer hx: negative  DVT: negative  Family or personal history of Pancreatic issues / Medullary Thyroid Cancer: negative  History of bariatric surgery: negative    FMH reviewed: obesity in parent/s or sibling: ?    REVIEW OF SYSTEMS  GENERAL: feels well otherwise, and negative fatigue   SKIN: denies any rashes to skin folds  HEENT: snoring- yes; denies neck thickening  LUNGS: denies shortness of breath with exertion, no apnea  CARDIOVASCULAR: denies chest pain on exertion, denies palpitations or pedal edema  GI: denies abdominal pain, distention, No N/V/D/C  MUSCULOSKELETAL: denies back pain, +multiple joint pains (fibro)  NEURO: denies headaches or dizziness  ENDOCRINE: denies any excess hunger, urination or thirst, denies any purple striae  PSYCH: denies change in behavior or mood, hx of anxiety/depression/bipolar     EXAM  /80   Pulse 99   Resp 18   Ht 5' (1.524 m)   Wt 189 lb (85.7 kg)   LMP 06/01/2022 (Within Months)   SpO2 98%   BMI 36.91 kg/m² , Percent body fat: F >32% Female 42.4%;  visceral fat 13 Muscle Mass: 21.6 lbs%       GENERAL: well developed, well nourished, in no apparent distress  SKIN: warm, pink, dry without rashes to exposed area   EYES: conjunctiva pink, sclera non icteric, PERRLA  HEENT: atraumatic, normocephalic, O/p: Mallampati score- 2  NECK: supple, non tender, no adenopathy, no  thyromegaly  LUNGS: CTA in all fields, breathing non labored  CARDIO: RRR without murmur, normal S1 and S2 without clicks or gallops, no pedal edema  GI: +BS, soft, no masses, HSM or tenderness  EXTREMITIES: grossly intact  NEURO: Oriented times three, full ROM of bilateral UE/LE  PSYCH: pleasant, cooperative, normal mood and affect, no si/hi    Lab Results   Component Value Date    GLU 92 12/07/2024    BUN 15 12/07/2024    CREATSERUM 1.01 12/07/2024    ANIONGAP 10 12/07/2024    GFR 77 04/16/2017    GFRNAA > 90 05/10/2011    GFRAA > 90 05/10/2011    CA 9.7 12/07/2024    OSMOCALC 298 (H) 12/07/2024    ALKPHO 91 12/07/2024    AST 24 12/07/2024    ALT 35 12/07/2024    BILT 0.3 12/07/2024    TP 7.8 12/07/2024    ALB 4.6 12/07/2024    GLOBULIN 3.2 12/07/2024     12/07/2024    K 4.0 12/07/2024     12/07/2024    CO2 24.0 12/07/2024     Lab Results   Component Value Date     08/17/2024    A1C 5.8 (H) 08/17/2024     Lab Results   Component Value Date    CHOLEST 208 (H) 08/17/2024    TRIG 105 08/17/2024    HDL 43 08/17/2024     (H) 08/17/2024    VLDL 20 08/17/2024    NONHDLC 165 (H) 08/17/2024     Lab Results   Component Value Date    B12 377 08/17/2024     Lab Results   Component Value Date    VITD 34.8 08/17/2024       Medications Ordered Prior to Encounter[1]    ASSESSMENT/PLAN    ICD-10-CM    1. Therapeutic drug monitoring  Z51.81       2. Obesity (BMI 30-39.9)  E66.9       3. Hypothyroidism, unspecified type  E03.9       4. Mixed hyperlipidemia  E78.2       5. Fibromyalgia  M79.7       6. Obstructive sleep apnea  G47.33         Initial Weight Data and Goal Weight Loss:  Weight Calculations  Initial Weight: 189 lbs  Initial Weight Date: 06/23/25  Today's Weight: 189 lbs  5% Goal: 9.45  10% Goal: 18.9  Total Weight Loss: 0 lbs  Initial consult: 189 lbs on 6/2025, Down  Lb:  lbs total     PLAN   Will continue with medications: Wegovy 1.7 mg weekly will take over prescription from PCP  Will continue  with medications: metformin 500mg bid (from pcp)  --advised of side effects and adverse effects of this medication  --Instructed to use contraception at all times while on AOMs  Contradictions: Avoid stimulant medications based on psych history  Body composition test results given   Reviewed labs  Continue with vitamin d OTC  HLD  stable, follows with PCP   Fibromyalgia, recommended aqua therapy  Anxiety/depression/bipolar- stable, currently seeing psych    prediabetes, reviewed last a1c 5.8% on 8/2024 - on metformin with pcp  SAI- has CPAP machine but doesn't use it   Hypothyroid- stable, continue with current medication regimen, managed by PCP   Decrease carbs, increase protein, no skipping meals   Needs to incorporate exercise regimen FITTE: ACSM recommendations (150-300 minutes/ week in active weight loss)   Follow up with dietitian and psychologist as recommended.  Discussed the role of sleep and stress in weight management.  Counseled on comprehensive weight loss plan including attention to nutrition, exercise and behavior/stress management for success. See patient instruction below for more details.    Total time spent on chart review, pre-charting, obtaining history, counseling, and educating, reviewing labs was 50 minutes.       NOTE TO PATIENT: The 21st Century Cures Act makes clinical notes like these available to patients in the interest of transparency. Clinical notes are medical documents used by physicians and care providers to communicate with each other. These documents include medical language and terminology, abbreviations, and treatment information that may sound technical and at times possibly unfamiliar. In addition, at times, the verbiage may appear blunt or direct. These documents are one tool providers use to communicate relevant information and clinical opinions of the care providers in a way that allows common understanding of the clinical context.     Weight Loss Contract reviewed and  signed today 6/23/2025    There are no Patient Instructions on file for this visit.    No follow-ups on file.    Patient verbalizes understanding.    CARIDAD Rodriguez           [1]   Current Outpatient Medications on File Prior to Visit   Medication Sig Dispense Refill    semaglutide-weight management 1.7 MG/0.75ML Subcutaneous Solution Auto-injector Inject 0.75 mL (1.7 mg total) into the skin once a week. 6 mL 0    Cholecalciferol (VITAMIN D3) 250 MCG (30301 UT) Oral Cap Take 1 capsule by mouth daily.      OLANZapine 5 MG Oral Tab Take 1 tablet (5 mg total) by mouth nightly. 90 tablet 0    buPROPion  MG Oral Tablet 24 Hr Take 1 tablet (150 mg total) by mouth every morning. Patient will see new provider on 3/25 90 tablet 0    clonazePAM 0.5 MG Oral Tab Take 1-2 tablets at bedtime as needed for anxiety 60 tablet 2    metFORMIN 500 MG Oral Tab Take 1 tablet (500 mg total) by mouth 2 (two) times daily with meals. 60 tablet 3    norethindrone 5 MG Oral Tab TAKE 1/2 TABLET(2.5 MG) BY MOUTH DAILY 45 tablet 2    Naratriptan HCl 2.5 MG Oral Tab Take 1 tablet (2.5 mg total) by mouth as needed for Migraine (Take every 4 hours for migraines - max 2 tablets/24 hours). 30 tablet 1     No current facility-administered medications on file prior to visit.

## 2025-06-30 ENCOUNTER — TELEPHONE (OUTPATIENT)
Facility: CLINIC | Age: 45
End: 2025-06-30

## 2025-06-30 RX ORDER — NORETHINDRONE 5 MG/1
2.5 TABLET ORAL DAILY
Qty: 45 TABLET | Refills: 0 | Status: SHIPPED | OUTPATIENT
Start: 2025-06-30

## 2025-06-30 NOTE — TELEPHONE ENCOUNTER
Spoke with patient. 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 Scheduled annual exam 8/23/25. Aware will send a refill to her pharmacy. Verbalized understanding.

## 2025-07-30 DIAGNOSIS — R73.03 PREDIABETES: ICD-10-CM

## 2025-07-30 DIAGNOSIS — E66.01 MORBID OBESITY (HCC): ICD-10-CM

## 2025-08-23 ENCOUNTER — OFFICE VISIT (OUTPATIENT)
Facility: CLINIC | Age: 45
End: 2025-08-23

## 2025-08-23 VITALS
HEIGHT: 60 IN | SYSTOLIC BLOOD PRESSURE: 112 MMHG | WEIGHT: 185 LBS | HEART RATE: 94 BPM | DIASTOLIC BLOOD PRESSURE: 70 MMHG | BODY MASS INDEX: 36.32 KG/M2

## 2025-08-23 DIAGNOSIS — Z01.419 WELL WOMAN EXAM WITH ROUTINE GYNECOLOGICAL EXAM: Primary | ICD-10-CM

## 2025-08-23 DIAGNOSIS — Z12.31 ENCOUNTER FOR SCREENING MAMMOGRAM FOR MALIGNANT NEOPLASM OF BREAST: ICD-10-CM

## 2025-08-23 PROCEDURE — 99459 PELVIC EXAMINATION: CPT

## 2025-08-23 PROCEDURE — 99396 PREV VISIT EST AGE 40-64: CPT

## 2025-08-23 PROCEDURE — 3008F BODY MASS INDEX DOCD: CPT

## 2025-08-23 PROCEDURE — 3074F SYST BP LT 130 MM HG: CPT

## 2025-08-23 PROCEDURE — 3078F DIAST BP <80 MM HG: CPT

## (undated) DEVICE — SYRINGE MED 10ML LL TIP W/O SFTY DISP

## (undated) DEVICE — NEPTUNE E-SEP SMOKE EVACUATION PENCIL, COATED, 70MM BLADE, PUSH BUTTON SWITCH: Brand: NEPTUNE E-SEP

## (undated) DEVICE — LAP CHOLE/APPY CDS-LF: Brand: MEDLINE INDUSTRIES, INC.

## (undated) DEVICE — CLIP APPLIER WITH CLIP LOGIC TECHNOLOGY: Brand: ENDO CLIP III

## (undated) DEVICE — COVER,LIGHT,CAMERA,HARD,1/PK,STRL: Brand: MEDLINE

## (undated) DEVICE — GLOVE SUR 7.5 SENSICARE PI PIP CRM PWD F

## (undated) DEVICE — SHEET,DRAPE,40X58,STERILE: Brand: MEDLINE

## (undated) DEVICE — SLEEVE COMPR MD KNEE LEN SGL USE KENDALL SCD

## (undated) DEVICE — L-HOOK CAUTERY PROBE TIP, DISPOSABLE: Brand: RENEW

## (undated) DEVICE — TRADITIONAL MARYLAND DISSECTOR TIP, DISPOSABLE: Brand: RENEW

## (undated) DEVICE — CATHETER URET 5FR L70CM FLX OPN TIP NONPORTED

## (undated) DEVICE — MINI ENDOCUT SCISSOR TIP, DISPOSABLE: Brand: RENEW

## (undated) DEVICE — GUIDEWIRE .035X150 STR ZIPWIRE

## (undated) DEVICE — DALE ABDOMINAL BINDER, 12" WIDE, STRETCHES TO FIT 30"-45", 1 PER BOX.: Brand: DALE ABDOMINAL BINDER

## (undated) DEVICE — C-ARM: Brand: UNBRANDED

## (undated) DEVICE — 40580 - THE PINK PAD - ADVANCED TRENDELENBURG POSITIONING KIT: Brand: 40580 - THE PINK PAD - ADVANCED TRENDELENBURG POSITIONING KIT

## (undated) DEVICE — TROCARS: Brand: KII® BALLOON BLUNT TIP SYSTEM

## (undated) DEVICE — SUT MCRYL 4-0 18IN PS-2 ABSRB UD 19MM 3/8 CIR

## (undated) DEVICE — GRABBER GRASPER TIP, DISPOSABLE: Brand: RENEW

## (undated) DEVICE — TROCAR: Brand: KII® SLEEVE

## (undated) DEVICE — TROCAR: Brand: KII FIOS FIRST ENTRY

## (undated) DEVICE — ANTIBACTERIAL VIOLET BRAIDED (POLYGLACTIN 910), SYNTHETIC ABSORBABLE SUTURE: Brand: COATED VICRYL

## (undated) DEVICE — POUCH SPECIMEN WIRE 6X3 250ML

## (undated) NOTE — MR AVS SNAPSHOT
Ollie Jules  [address]  [facility] [address]  [address] [place] [phone]               Thank you for choosing us for your health care visit with Edmar James MD.  We are glad to serve you and happy to provide you with this sum Results of Recent Testing     FETAL NON-STRESS TEST EMG ONLY 24963           FETAL NON-STRESS TEST EMG ONLY 74545             MyChart     Visit MyChart  You can access your MyChart to more actively manage your health care and view more details from this vi

## (undated) NOTE — MR AVS SNAPSHOT
Ollie Jules  [address]elo, [facility] 100  [address] [phone]               Thank you for choosing us for your health care visit with [facility], DO.   We are glad to serve you and happy to provide you with this Visits < Visit Summaries. MyChart questions? Call [phone] for help. Streamline[employer] is NOT to be used for urgent needs. For medical emergencies, dial 911.            Visit [facility] online at  [email or web address]

## (undated) NOTE — MR AVS SNAPSHOT
[address] [place] [phone]               Thank you for choosing us for your health care visit with Luly Wyatt MD.  We are glad to serve you and happy to provide you with this sum Iron (Ferrous Gluconate) 256 (28 Fe) MG Tabs   Take 1 tablet by mouth daily. Triamterene-HCTZ 37.5-25 MG Caps   Take 1 capsule by mouth daily as needed.    Commonly known as:  Pleasant Olga 30-1.4-200 MG Cpcr                Where to

## (undated) NOTE — MR AVS SNAPSHOT
Ollie Jules  [address], [facility] 100  [address] [phone]               Thank you for choosing us for your health care visit with [facility], DO.   We are glad to serve you and happy to provide you with this If you've recently had a stay at the Hospital you can access your discharge instructions in [employer] by going to Visits < Admission Summaries.  If you've been to the Emergency Department or your doctor's office, you can view your past visit information in My

## (undated) NOTE — LETTER
[facility]  Prudence Squires [address] [facility], [address] [place] [address]    Consent for Operation    Date: __________________    Time: _______________    1.  I authorize the performance upon Milton Santiago the following operation:                              Repeat C videotape. The [facility] will not be responsible for storage or maintenance of this tape. 6. For the purpose of advancing medical education, I consent to the admittance of observers to the Operating Room.     7. I authorize the use of any specimen, organs Signature of Patient:   ___________________________    When the patient is a minor or mentally incompetent to give consent:  Signature of person authorized to consent for patient: ___________________________   Relationship to patient: _____________________ · If I am allergic to anything or have had a reaction to anesthesia before. 3. I understand how the anesthesia medicine will help me (benefits). 4. I understand that with any type of anesthesia medicine there are risks:  a.  The most common risks are: their representative has agreed to have anesthesia services.     _____________________________________________________________________________  Witness        Date   Time  I have verified that the signature is that of the patient or patient’s representative

## (undated) NOTE — Clinical Note
IMPRESSION: IUP at 34w4d AMA: low-risk PGD, declined invasive testing  Maternal Obesity  IVF Gestation  Prior C/S  Low-normal CHRIS on last week's U/S: NORMAL TODAY  RECOMMENDATIONS: Continue care with Dr. Ish Ashley Continue weekly NST's

## (undated) NOTE — LETTER
[facility] [address], [address] [facility]    Consent for Anesthesia   1.    Lashawn Matos agree to be cared for by an anesthesiologist, who is specially trained to monitor me and give me medicine to put me to sleep or keep me comfort vision, nerves, or muscles and in extremely rare instances death. 5. My doctor has explained to me other choices available to me for my care (alternatives).   6. Pregnant Patients (“epidural”):  I understand that the risks of having an epidural (medicine g

## (undated) NOTE — MR AVS SNAPSHOT
Ollie Jules  [address]  Ghada Ordoñez, [facility] 100  [address] [phone]               Thank you for choosing us for your health care visit with Viji Renner MD.  We are glad to serve you and happy to provide you with this sum office, you can view your past visit information in [employer] by going to Visits < Visit Summaries. Siklu questions? Call [phone] for help. S[employer]lu is NOT to be used for urgent needs. For medical emergencies, dial 911.            Visit EDWARD-EL

## (undated) NOTE — LETTER
10/30/24    Patient: Elidia Olmstead  : 1980 Visit date: 10/22/2024    Dear  Paula Irwin MD    Thank you for referring Elidia Olmstead to my practice.  Please find my assessment and plan below.        Elidia presents for classic biliary colic symptoms.  She does have cholelithiasis.  I am recommending laparoscopic cholecystectomy.  Thank you Eleno  Sincerely,       Yoni Hurley DO   CC: No Recipients

## (undated) NOTE — IP AVS SNAPSHOT
[facility] [place] [address] [place], [address] ~ [phone]                Discharge Summary   3/22/2017    Jina  Shai Pawan           Admission Information        Provider Department    3/22/2017 DO Joey Moreau [address] [address]     Phone:  [phone]    - ibuprofen 600 MG Tabs  - Iron (Ferrous Gluconate) 256 (28 Fe) MG Tabs      Please  your prescriptions at the location directed by your doctor or nurse     Bring 10.4 (03/25/17)  2.68 (L) (03/25/17)  8.0 (L) (03/25/17)  23.4 (L) (03/25/17)  87.3 -- -- -- (03/25/17)  183.0 --    (03/25/17)  11.4 (03/25/17)  2.71 (L) (03/25/17)  8.0 (L) (03/25/17)  24.0 (L) (03/25/17)  88.6    (03/25/17)  187.0     (03/24/17)  12.3 ( Pain Management Resolved   Fetal Monitoring Resolved   Delivery Process Resolved   Tocolytics Resolved   Antibiotics Resolved   Psychosocial/Spiritual Support Resolved   Community Resources Resolved   Preeclampsia/Hypertension Resolved         Handwashing If you've recently had a stay at the Hospital you can access your discharge instructions in [employer] by going to Visits < Admission Summaries.  If you've been to the Emergency Department or your doctor's office, you can view your past visit information in My

## (undated) NOTE — LETTER
[facility] [address], [address] [facility]    Consent for Anesthesia   1.    Carolyn Solis agree to be cared for by an anesthesiologist, who is specially trained to monitor me and give me medicine to put me to sleep or keep me comfort vision, nerves, or muscles and in extremely rare instances death. 5. My doctor has explained to me other choices available to me for my care (alternatives).   6. Pregnant Patients (“epidural”):  I understand that the risks of having an epidural (medicine g

## (undated) NOTE — ED AVS SNAPSHOT
[facility] Emergency Department    [place] [address]    Phone:  [phone]    Fax:  [phone]           Mrs. Vanessa Malik   MRN: [id number]    Department:  [facility] Emergency Department   Date of Visit: doctor. Please ask your health care provider, pharmacist or nurse if you have any questions regarding your home medications, including potential side effects.               Medication List      START taking these medications     cephALEXin 500 MG Kathryn De La Paz receive this, we would really appreciate it if you could take the time to complete it. Thank you! You were examined and treated today on an urgent basis only. This was not a substitute for ongoing medical care.  Often, one Emergency Department visit d 4455  [facility] ([address]) [address] ([address]) [address]  [address] (Etha Fearing) 21  [address] ([address]) 786 solely for the purposes of excluding an abscess and was not done for evaluation of any palpable   abnormality or screening for breast cancer. FINDINGS:  There is no evidence of drainable fluid collection.  Prominence of the ducts is common in a lacta

## (undated) NOTE — Clinical Note
IMPRESSION: IUP at 33w2d AMA: low-risk PGD, declined invasive testing  Maternal Obesity  IVF Gestation  Prior C/S Low-normal CHRIS Minimally elevated BP: No signs or symptoms of preeclampsia  RECOMMENDATIONS: Continue care with Dr. Willy Jo W

## (undated) NOTE — MR AVS SNAPSHOT
Ollie Jules  [address], [facility] 100  [address] [phone]               Thank you for choosing us for your health care visit with Roderick Price DO.   We are glad to serve you and happy to provide you with this US OB FOLLOW UP SPECIFIC CONDITION PER FETUS EMG ONLY             MyChart     Visit MyChart  You can access your MyChart to more actively manage your health care and view more details from this visit by going to [email or web address].   If you've re

## (undated) NOTE — MR AVS SNAPSHOT
[address]  [address] [phone]               Thank you for choosing us for your health care visit with Tristian Tapia DO.   We are glad to serve you and happy to provide you with this - HYDROcodone-acetaminophen 5-325 MG Tabs            MyChart     Visit MyChart  You can access your MyChart to more actively manage your health care and view more details from this visit by going to [email or web address].   If you've recently had a

## (undated) NOTE — Clinical Note
IMPRESSION: IUP at 33w2d AMA: low-risk PGD, declined invasive testing  Maternal Obesity  IVF Gestation  Prior C/S  RECOMMENDATIONS: Continue care with Dr. Arlena Hatchet Weekly NSTs at 36 weeks

## (undated) NOTE — MR AVS SNAPSHOT
After Visit Summary   2017    [place]    MRN: [id number]           Visit Information        Provider Department Dept Phone    2017  1:00 PM [facility] PNORM1   Outpt [phone]      Your Vitals Were     BP Pulse Wt LMP Enter your [employer] Activation Code exactly as it appears below. You will not need to use this code after you have completed the sign-up process. If you do not sign up before the expiration date, you must request a new code.       [employer] Activation Code: PG walking, light jogging, cycling, swimming, etc.) for a goal of at least 150 minutes per week. Moderation of alcohol consumption Men: limit to <= 2 drinks* per day. Women and lighter weight persons: limit to <= 1 drink* per day.                          D

## (undated) NOTE — ED AVS SNAPSHOT
[facility] Emergency Department    [place] [address]    Phone:  [phone]    Fax:  [phone]           Mrs. Frieda Bustamante   MRN: [id number]    Department:  [facility] Emergency Department   Date of Visit: IF THERE IS ANY CHANGE OR WORSENING OF YOUR CONDITION, CALL YOUR PRIMARY CARE PHYSICIAN AT ONCE OR RETURN IMMEDIATELY TO THE EMERGENCY DEPARTMENT.     If you have been prescribed any medication(s), please fill your prescription right away and begin taking t

## (undated) NOTE — MR AVS SNAPSHOT
Ollie Jules  [address]  Rhonda Chad, [facility] 100  [address] [place] [phone]               Thank you for choosing us for your health care visit with Micheline Bass MD.  We are glad to serve you and happy to provide you with this sum [T48.231], Amniotic fluid index borderline low [O28.8]                 Follow-up Instructions     Return in about 1 week (around 2/27/2017) for nst, return ob.          Results of Recent Testing     FETAL NON-STRESS TEST EMG ONLY [address]05             [facility]

## (undated) NOTE — MR AVS SNAPSHOT
Ollie Jules  [address]  [facility], [facility] 100  [address] [phone]               Thank you for choosing us for your health care visit with Swapna Ann MD.  We are glad to serve you and happy to provide you with this sum [email or web address]. If you've recently had a stay at the Hospital you can access your discharge instructions in [employer] by going to Visits < Admission Summaries.  If you've been to the Emergency Department or your doctor's office, you can view yo

## (undated) NOTE — MR AVS SNAPSHOT
After Visit Summary   2017    Remedios Bejarano    MRN: [id number]           Visit Information        Provider Department Dept Phone    2017  1:00 PM [facility] PNORM1   Outpt [phone]      Your Vitals Were     BP Pulse Wt LMP Enter your Zip Code and Date of Birth (mm/dd/yyyy) as indicated and click Next. You will be taken to the next sign-up page. Create a "[employer]t Username.  This will be your "[employer]t login Username and cannot be changed, so think of one that is secure and eas